# Patient Record
Sex: FEMALE | Race: BLACK OR AFRICAN AMERICAN
[De-identification: names, ages, dates, MRNs, and addresses within clinical notes are randomized per-mention and may not be internally consistent; named-entity substitution may affect disease eponyms.]

---

## 2017-06-02 NOTE — WOMENS IMAGING REPORT
EXAM DESCRIPTION:  BILAT SCREENING MAMMO W/CAD



COMPLETED DATE/TIME:  6/1/2017 2:30 pm



REASON FOR STUDY:  Z12.31 ROUTINE MAMMO Z12.31  ENCNTR SCREEN MAMMOGRAM FOR MALIGNANT NEOPLASM OF CAROLYNN




COMPARISON:  Multiple since 2012



TECHNIQUE:  Standard craniocaudal and mediolateral oblique views of each breast recorded using tadoÂ°a
l acquisition.



LIMITATIONS:  None.



FINDINGS:  No masses, calcifications or architectural distortion. No areas of suspicion.

Read with the assistance of CAD.

.[facility] Cenova Version 1.3

.[facility] - R2 Cenova Version 1.3

.[facility] - R2 Cenova Version 2.4

[place]ova Version 2.[place]

.[facility] - R2  Version 9.2



IMPRESSION:  NORMAL MAMMOGRAM.  BIRADS 1.



BREAST DENSITY:  b. There are scattered areas of fibroglandular density.



BIRAD:  1 NEGATIVE



RECOMMENDATION:  ROUTINE SCREENING



COMMENT:  The patient has been notified of the results by letter per SA requirements. Additional no
tification policies are in place for contacting patient with suspicious or incomplete findings.

Quality ID #225: The American College of Radiology recommends an annual screening mammogram for women
 aged 40 years or over. This facility utilizes a reminder system to ensure that all patients receive 
reminder letters, and/or direct phone calls for appointments. This includes reminders for routine scr
eening mammograms, diagnostic mammograms, or other Breast Imaging Interventions when appropriate.  Th
is patient will be placed in the appropriate reminder system.

The American College of Radiology (ACR) has developed recommendations for screening MRI of the breast
s in certain patient populations, to be used in conjunction with mammography.  Breast MRI surveillanc
e may be appropriate for women with more than 20% lifetime risk of developing breast cancer  as deter
mined by genetic testing, significant family history of the disease, or history of mantle radiation f
or Hodgkins Disease.  ACR Practice Guidelines 2008.



TECHNICAL DOCUMENTATION:  FINDING NUMBER: (1)

ASSESSMENT: (1)

JOB ID:  [id number]

 2011 [employer]- All Rights Reserved

## 2018-03-12 ENCOUNTER — HOSPITAL ENCOUNTER (OUTPATIENT)
Dept: HOSPITAL 62 - OD | Age: 48
End: 2018-03-12
Attending: INTERNAL MEDICINE
Payer: MEDICARE

## 2018-03-12 DIAGNOSIS — Z79.899: ICD-10-CM

## 2018-03-12 DIAGNOSIS — Z94.83: ICD-10-CM

## 2018-03-12 DIAGNOSIS — B25.9: ICD-10-CM

## 2018-03-12 DIAGNOSIS — R19.7: ICD-10-CM

## 2018-03-12 DIAGNOSIS — Z94.0: Primary | ICD-10-CM

## 2018-03-12 LAB
AMYLASE SERPL-CCNC: 46 U/L (ref 30–110)
ANION GAP SERPL CALC-SCNC: 7 MMOL/L (ref 5–19)
APPEARANCE UR: CLEAR
APTT PPP: YELLOW S
BILIRUB UR QL STRIP: NEGATIVE
BUN SERPL-MCNC: 28 MG/DL (ref 7–20)
CALCIUM: 9.6 MG/DL (ref 8.4–10.2)
CHLORIDE SERPL-SCNC: 105 MMOL/L (ref 98–107)
CO2 SERPL-SCNC: 29 MMOL/L (ref 22–30)
CREAT UR-MCNC: 105.9 MG/DL (ref 15–278)
ERYTHROCYTE [DISTWIDTH] IN BLOOD BY AUTOMATED COUNT: 14 % (ref 11.5–14)
GLUCOSE SERPL-MCNC: 111 MG/DL (ref 75–110)
GLUCOSE UR STRIP-MCNC: NEGATIVE MG/DL
HCT VFR BLD CALC: 38.8 % (ref 36–47)
HGB BLD-MCNC: 12.5 G/DL (ref 12–15.5)
KETONES UR STRIP-MCNC: NEGATIVE MG/DL
LIPASE SERPL-CCNC: 58.1 U/L (ref 23–300)
MCH RBC QN AUTO: 27.9 PG (ref 27–33.4)
MCHC RBC AUTO-ENTMCNC: 32.2 G/DL (ref 32–36)
MCV RBC AUTO: 87 FL (ref 80–97)
NITRITE UR QL STRIP: NEGATIVE
PH UR STRIP: 6 [PH] (ref 5–9)
PLATELET # BLD: 225 10^3/UL (ref 150–450)
POTASSIUM SERPL-SCNC: 4.8 MMOL/L (ref 3.6–5)
PROT UR STRIP-MCNC: 6.7 MG/DL (ref ?–12)
PROT UR STRIP-MCNC: NEGATIVE MG/DL
RBC # BLD AUTO: 4.49 10^6/UL (ref 3.72–5.28)
SODIUM SERPL-SCNC: 141.2 MMOL/L (ref 137–145)
SP GR UR STRIP: 1.02
UR PRO/CREAT RATIO RESULT: 0.1 MG/MG (ref 0–0.2)
UROBILINOGEN UR-MCNC: NEGATIVE MG/DL (ref ?–2)
WBC # BLD AUTO: 5.6 10^3/UL (ref 4–10.5)

## 2018-03-12 PROCEDURE — 87496 CYTOMEG DNA AMP PROBE: CPT

## 2018-03-12 PROCEDURE — 85027 COMPLETE CBC AUTOMATED: CPT

## 2018-03-12 PROCEDURE — 36415 COLL VENOUS BLD VENIPUNCTURE: CPT

## 2018-03-12 PROCEDURE — 84156 ASSAY OF PROTEIN URINE: CPT

## 2018-03-12 PROCEDURE — 87799 DETECT AGENT NOS DNA QUANT: CPT

## 2018-03-12 PROCEDURE — 80048 BASIC METABOLIC PNL TOTAL CA: CPT

## 2018-03-12 PROCEDURE — 83690 ASSAY OF LIPASE: CPT

## 2018-03-12 PROCEDURE — 80197 ASSAY OF TACROLIMUS: CPT

## 2018-03-12 PROCEDURE — 81001 URINALYSIS AUTO W/SCOPE: CPT

## 2018-03-12 PROCEDURE — 82150 ASSAY OF AMYLASE: CPT

## 2018-03-12 PROCEDURE — 82570 ASSAY OF URINE CREATININE: CPT

## 2018-03-14 LAB
CMV DNA SERPL NAA+PROBE-LOG IU: (no result) {LOG_IU}/ML
TACROLIMUS SERPL-MCNC: 6.6 NG/ML (ref 2–20)

## 2018-06-04 ENCOUNTER — HOSPITAL ENCOUNTER (OUTPATIENT)
Dept: HOSPITAL 62 - WI | Age: 48
End: 2018-06-04
Attending: OBSTETRICS & GYNECOLOGY
Payer: MEDICARE

## 2018-06-04 DIAGNOSIS — R92.2: ICD-10-CM

## 2018-06-04 DIAGNOSIS — Z12.31: Primary | ICD-10-CM

## 2018-06-04 PROCEDURE — 77067 SCR MAMMO BI INCL CAD: CPT

## 2018-06-04 PROCEDURE — 77063 BREAST TOMOSYNTHESIS BI: CPT

## 2018-06-04 NOTE — WOMENS IMAGING REPORT
EXAM DESCRIPTION:  3D SCREENING MAMMO BILAT



COMPLETED DATE/TIME:  6/4/2018 9:43 am



REASON FOR STUDY:  ROUTINE SCREENING;Z12.31 Z12.31  ENCNTR SCREEN MAMMOGRAM FOR MALIGNANT NEOPLASM OF
 CAROLYNN



COMPARISON:  6/1/2017 and 12/16/2015.



TECHNIQUE:  Standard craniocaudal and mediolateral oblique views of each breast recorded using digita
l acquisition and breast tomosynthesis.



LIMITATIONS:  None.



FINDINGS:  RIGHT BREAST

MASSES: No suspicious masses.

CALCIFICATIONS: No new or suspicious calcifications.

ARCHITECTURAL DISTORTION: None.

DEVELOPING DENSITY: None.

ASYMMETRY: None noted.

OTHER: No other significant findings.

LEFT BREAST

MASSES: No suspicious masses.

CALCIFICATIONS: No new or suspicious calcifications.

ARCHITECTURAL DISTORTION: None.

DEVELOPING DENSITY: Possible developing density in the upper-outer breast, located 2 cm from the nipp
le.

ASYMMETRY: None noted.

OTHER: No other significant findings.

Read with the assistance of CAD.

.[facility] - R2 Cenova Version 1.3

.[facility] - R2 Cenova Version 1.3

.[facility] - R2 Cenova Version 2.4

.[facility] [place] - R2 Cenova Version 2.4

.[facility] - R2  Version 9.2



IMPRESSION:  Possible developing density in the upper-outer left breast.  Stable mammographic appeara
nce of the right breast.



BREAST DENSITY:  b. There are scattered areas of fibroglandular density.



BIRAD:  0 Incomplete:  Needs Additional Imaging Evaluation and/or prior Mammograms for Comparison.



RECOMMENDATION:  RECOMMENDED FOLLOW-UP: Recommend additional evaluation with compression breast tomos
ynthesis views of the left breast and ultrasound of the left breast.  Recommend routine screening khoi
mography of the right breast.

The patient will be contacted for additional imaging.



COMMENT:  The patient has been notified of the results by letter per SA requirements. Additional no
tification policies are in place for contacting patient with suspicious or incomplete findings.

Quality ID #225: The American College of Radiology recommends an annual screening mammogram for women
 aged 40 years or over. This facility utilizes a reminder system to ensure that all patients receive 
reminder letters, and/or direct phone calls for appointments. This includes reminders for routine scr
eening mammograms, diagnostic mammograms, or other Breast Imaging Interventions when appropriate.  Th
is patient will be placed in the appropriate reminder system.

The American College of Radiology (ACR) has developed recommendations for screening MRI of the breast
s in certain patient populations, to be used in conjunction with mammography.  Breast MRI surveillanc
e may be appropriate for women with more than 20% lifetime risk of developing breast cancer  as deter
mined by genetic testing, significant family history of the disease, or history of mantle radiation f
or Hodgkins Disease.  ACR Practice Guidelines 2008.

[employer]



PQRS 6045F: Fluoroscopic imaging is not utilized for breast tomosynthesis.



TECHNICAL DOCUMENTATION:  FINDING NUMBER: (1)

ASSESSMENT: (1)

JOB ID:  [id number]

 2011 [employer]- All Rights Reserved



Reading location - IP/workstation name: [email or web address]

## 2018-06-12 ENCOUNTER — HOSPITAL ENCOUNTER (OUTPATIENT)
Dept: HOSPITAL 62 - WI | Age: 48
End: 2018-06-12
Attending: OBSTETRICS & GYNECOLOGY
Payer: MEDICARE

## 2018-06-12 DIAGNOSIS — N63.21: Primary | ICD-10-CM

## 2018-06-13 NOTE — WOMENS IMAGING REPORT
EXAM DESCRIPTION:  LEFT DIAGNOSTIC MAMMO W/CAD



COMPLETED DATE/TIME:  6/12/2018 10:01 am



REASON FOR STUDY:  LEFT BREAST DENSITY N63.21  UNSPECIFIED LUMP IN THE LEFT BREAST, UPPER OUTER QUAD



COMPARISON:  Multiple since 2012



TECHNIQUE:  Cone compression craniocaudal and mediolateral oblique images of the breast recorded with
 digital acquisition.



LIMITATIONS:  None.



FINDINGS:  BREAST: Left

MASSES: No suspicious masses.

CALCIFICATIONS: No new or suspicious calcifications.

ARCHITECTURAL DISTORTION: None.

DEVELOPING DENSITY: None.

ASYMMETRY: None noted.

OTHER: No other significant findings.

Read with the assistance of CAD.

.[facility] - R2 Cenova Version 1.3

.[facility] - R2 Cenova Version 1.3

.[facility] - R2 Cenova Version 2.4

.[facility] - R2 Cenova Version 2.4

.[facility] - R2  Version 9.2



IMPRESSION:  No mammographic evidence for malignancy left breast



BREAST DENSITY:  b. There are scattered areas of fibroglandular density.



BIRAD:  1 Negative.



RECOMMENDATION:  RECOMMENDED FOLLOW UP: Please continue yearly bilateral screening mammography/ tomos
ynthesis in June 2019

SPECIFIC INTERVENTION/IMAGING/CONSULTATION RECOMMENDED:No additional intervention/ imaging/consultati
on needed at this time.

COMMUNICATION:Patient notified by letter



COMMENT:  The patient has been notified of the results by letter per MQSA requirements. Additional no
tification policies are in place for contacting patient with suspicious or incomplete findings.

Quality ID #225: The American College of Radiology recommends an annual screening mammogram for women
 aged 40 years or over. This facility utilizes a reminder system to ensure that all patients receive 
reminder letters, and/or direct phone calls for appointments. This includes reminders for routine scr
eening mammograms, diagnostic mammograms, or other Breast Imaging Interventions when appropriate.  Th
is patient will be placed in the appropriate reminder system.

The American College of Radiology (ACR) has developed recommendations for screening MRI of the breast
s in certain patient populations, to be used in conjunction with mammography.  Breast MRI surveillanc
e may be appropriate for women with more than 20% lifetime risk of developing breast cancer  as deter
mined by genetic testing, significant family history of the disease, or history of mantle radiation f
or Hodgkins Disease.  ACR Practice Guidelines 2008.



TECHNICAL DOCUMENTATION:  FINDING NUMBER: (1)

ASSESSMENT: (1)

JOB ID:  [id number]

 2011 [employer]- All Rights Reserved



Reading location - IP/workstation name: [email or web address]

## 2019-02-05 ENCOUNTER — HOSPITAL ENCOUNTER (OUTPATIENT)
Dept: HOSPITAL 62 - OD | Age: 49
End: 2019-02-05
Attending: INTERNAL MEDICINE
Payer: MEDICARE

## 2019-02-05 DIAGNOSIS — B25.9: Primary | ICD-10-CM

## 2019-02-05 DIAGNOSIS — Z79.899: ICD-10-CM

## 2019-02-05 DIAGNOSIS — Z94.0: ICD-10-CM

## 2019-02-05 DIAGNOSIS — Z94.83: ICD-10-CM

## 2019-02-05 LAB
AMYLASE SERPL-CCNC: 55 U/L (ref 30–110)
ANION GAP SERPL CALC-SCNC: 8 MMOL/L (ref 5–19)
APPEARANCE UR: CLEAR
APTT PPP: YELLOW S
BILIRUB UR QL STRIP: NEGATIVE
BUN SERPL-MCNC: 31 MG/DL (ref 7–20)
CALCIUM: 9.4 MG/DL (ref 8.4–10.2)
CHLORIDE SERPL-SCNC: 106 MMOL/L (ref 98–107)
CO2 SERPL-SCNC: 27 MMOL/L (ref 22–30)
CREAT UR-MCNC: 112.9 MG/DL (ref 15–278)
ERYTHROCYTE [DISTWIDTH] IN BLOOD BY AUTOMATED COUNT: 13.8 % (ref 11.5–14)
GLUCOSE SERPL-MCNC: 121 MG/DL (ref 75–110)
GLUCOSE UR STRIP-MCNC: NEGATIVE MG/DL
HCT VFR BLD CALC: 37.8 % (ref 36–47)
HGB BLD-MCNC: 12.4 G/DL (ref 12–15.5)
KETONES UR STRIP-MCNC: NEGATIVE MG/DL
LIPASE SERPL-CCNC: 60.2 U/L (ref 23–300)
MCH RBC QN AUTO: 28.5 PG (ref 27–33.4)
MCHC RBC AUTO-ENTMCNC: 32.7 G/DL (ref 32–36)
MCV RBC AUTO: 87 FL (ref 80–97)
NITRITE UR QL STRIP: NEGATIVE
PH UR STRIP: 6 [PH] (ref 5–9)
PLATELET # BLD: 158 10^3/UL (ref 150–450)
POTASSIUM SERPL-SCNC: 5 MMOL/L (ref 3.6–5)
PROT UR STRIP-MCNC: 5.8 MG/DL (ref ?–12)
PROT UR STRIP-MCNC: NEGATIVE MG/DL
RBC # BLD AUTO: 4.34 10^6/UL (ref 3.72–5.28)
SODIUM SERPL-SCNC: 141.4 MMOL/L (ref 137–145)
SP GR UR STRIP: 1.02
UR PRO/CREAT RATIO RESULT: 0.1 MG/MG (ref 0–0.2)
UROBILINOGEN UR-MCNC: 2 MG/DL (ref ?–2)
WBC # BLD AUTO: 3.5 10^3/UL (ref 4–10.5)

## 2019-02-05 PROCEDURE — 83690 ASSAY OF LIPASE: CPT

## 2019-02-05 PROCEDURE — 80048 BASIC METABOLIC PNL TOTAL CA: CPT

## 2019-02-05 PROCEDURE — 87799 DETECT AGENT NOS DNA QUANT: CPT

## 2019-02-05 PROCEDURE — 80197 ASSAY OF TACROLIMUS: CPT

## 2019-02-05 PROCEDURE — 87496 CYTOMEG DNA AMP PROBE: CPT

## 2019-02-05 PROCEDURE — 82570 ASSAY OF URINE CREATININE: CPT

## 2019-02-05 PROCEDURE — 85027 COMPLETE CBC AUTOMATED: CPT

## 2019-02-05 PROCEDURE — 84156 ASSAY OF PROTEIN URINE: CPT

## 2019-02-05 PROCEDURE — 81001 URINALYSIS AUTO W/SCOPE: CPT

## 2019-02-05 PROCEDURE — 36415 COLL VENOUS BLD VENIPUNCTURE: CPT

## 2019-02-05 PROCEDURE — 82150 ASSAY OF AMYLASE: CPT

## 2019-02-07 LAB — TACROLIMUS SERPL-MCNC: 11.6 NG/ML (ref 2–20)

## 2019-02-08 LAB — CMV DNA SERPL NAA+PROBE-LOG IU: (no result) {LOG_IU}/ML

## 2019-04-12 ENCOUNTER — HOSPITAL ENCOUNTER (EMERGENCY)
Dept: HOSPITAL 62 - ER | Age: 49
Discharge: HOME | End: 2019-04-12
Payer: MEDICARE

## 2019-04-12 VITALS — SYSTOLIC BLOOD PRESSURE: 159 MMHG | DIASTOLIC BLOOD PRESSURE: 84 MMHG

## 2019-04-12 DIAGNOSIS — Z90.710: ICD-10-CM

## 2019-04-12 DIAGNOSIS — Z94.83: ICD-10-CM

## 2019-04-12 DIAGNOSIS — Z94.0: ICD-10-CM

## 2019-04-12 DIAGNOSIS — Z87.442: ICD-10-CM
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
## 2019-04-12 DIAGNOSIS — S92.355A: Primary | ICD-10-CM

## 2019-04-12 DIAGNOSIS — I10: ICD-10-CM

## 2019-04-12 DIAGNOSIS — E10.9: ICD-10-CM

## 2019-04-12 PROCEDURE — 99283 EMERGENCY DEPT VISIT LOW MDM: CPT

## 2019-04-12 NOTE — RADIOLOGY REPORT (SQ)
EXAM DESCRIPTION:  FOOT LEFT COMPLETE



COMPLETED DATE/TIME:  4/12/2019 7:42 pm



REASON FOR STUDY:  left lateral foot pain, near 5th metatarsal.



COMPARISON:  None.



NUMBER OF VIEWS:  Three views.



TECHNIQUE:  AP, lateral and oblique  radiographic images acquired of the left foot.



LIMITATIONS:  None.



FINDINGS:  MINERALIZATION: Normal.

BONES: Transverse fracture of the base of the 5th metatarsal without displacement.

JOINTS: No effusions.

SOFT TISSUES: No soft tissue swelling.  No foreign body.

OTHER: No other significant finding.



IMPRESSION:  Transverse fracture base of the 5th metatarsal.



TECHNICAL DOCUMENTATION:  JOB ID:  [id number]

 2011 [employer]- All Rights Reserved



Reading location - IP/workstation name: [email or web address]

## 2019-04-12 NOTE — ER DOCUMENT REPORT
HPI





- HPI


Time Seen by Provider: 19 19:22


Pain Level: 3


Notes: 





Patient is a 48-year-old female with a history of kidney and pancreas transplant

3 years ago who presents to the emergency department complaining of left lateral

foot pain status post possible injury today.  Patient states that she was 

walking when she felt a 'pop' in the lateral part of her foot.  Patient states 

that she has been able to ablate, but is limping.  She has noticed some mild 

swelling to the lateral foot since then.  Patient states that she otherwise 

feels well.  She has been eating and drinking without difficulty.  She is 

urinating normally and having normal bowel movements.  She has not noticed any 

other swelling to her legs bilaterally that is new for her.  Her specialist is 

in [facility].  Denies any headache, fever, URI, sore throat, chest pain, 

palpitations, syncope, cough, shortness of breath, wheeze, dyspnea, abdominal 

pain, nausea/vomiting/diarrhea, urinary retention, dysuria, hematuria, loss of 

control of bowel or bladder, numbness/tingling, muscle paralysis/weakness, or 

rash.





- ROS


Systems Reviewed and Negative: Yes All other systems reviewed and negative





- REPRODUCTIVE


Reproductive: DENIES: Pregnant:





- DERM


Skin Color: Normal





Past Medical History





- Social History


Smoking Status: Unknown if Ever Smoked


Family History: Reviewed & Not Pertinent


Patient has suicidal ideation: No


Patient has homicidal ideation: No





- Past Medical History


Cardiac Medical History: Reports: Hx Hypertension - on meds


   Denies: Hx Coronary Artery Disease, Hx Heart Attack


Pulmonary Medical History: Reports: Hx Tuberculosis - EXPOSURE AT WORK AND 

TREATED


   Denies: Hx Asthma, Hx Bronchitis, Hx COPD, Hx Pneumonia


Neurological Medical History: Denies: Hx Cerebrovascular Accident, Hx Seizures


Endocrine Medical History: Reports: Hx Diabetes Mellitus Type 1


Renal/ Medical History: Reports: Hx Kidney Stones.  Denies: Hx Peritoneal 

Dialysis


GI Medical History: 


Musculoskeletal Medical History: Reports Hx Arthritis


Infectious Medical History: 


Past Surgical History: Reports: Hx Hysterectomy, Hx Tubal Ligation.  Denies: Hx 

Appendectomy, Hx Bowel Surgery, Hx  Section, Hx Cholecystectomy, Hx 

Mastectomy, Hx Open Heart Surgery, Hx Tonsillectomy





- Immunizations


Hx Diphtheria, Pertussis, Tetanus Vaccination: Yes





Vertical Provider Document





- CONSTITUTIONAL


Agree With Documented VS: Yes


Notes: 





PHYSICAL EXAMINATION:





GENERAL: Well-appearing, well-nourished and in no acute distress.





LUNGS: Breath sounds clear to auscultation bilaterally and equal.  No wheezes 

rales or rhonchi.





HEART: Regular rate and rhythm without murmurs, rubs, gallops.





Musculoskeletal: Lt foot/ankle:  FROM to passive/active. Strength 5+/5. N/V 

intact distal.  + tenderness to the lateral approx 5th metatarsal and lateral 

plantar foot.  No bony tenderness of the ankle of foot otherwise.  Achilles 

intact.  





Extremities:  trace pitting edema b/l LE's.  Murphy neg b/l and other asymmetry 

noted.  PT/DP pulses 2+ b/l.  Foot is warm.  Capillary refill less than 3 

seconds.





NEUROLOGICAL: Normal speech, limping gait.  Normal sensory, motor exams 





PSYCH: Normal mood, normal affect.





SKIN: see above.





- INFECTION CONTROL


TRAVEL OUTSIDE OF THE [place] IN LAST 30 DAYS: No





Course





- Re-evaluation


Re-evalutation: 





19 


Patient is an afebrile, well-hydrated, 48-year-old female who presents to the ED

with a fracture to the left 5th metatarsal base.  Vitals are acceptable without 

any significant tachycardia, tachypnea, or hypoxia.  PE is otherwise 

unremarkable for any neurovascular compromise, obvious tendon/ligament rupture, 

open fracture, septic joint.  See XR result.  Splint applied today and crutches 

provided.  Patient is nontoxic-appearing.  No other labs or imaging warranted at

this time based on H&P.  Conservative measures otherwise for symptoms.  Recheck 

with your PCM in 3-5 days.  Call orthopedics Monday to schedule an appointment 

for further evaluation and management.  Return to the ED with any worsen

ing/concerning symptoms otherwise as reviewed in discharge.  Patient is in 

agreement.





- Vital Signs


Vital signs: 


                                        











Temp Pulse Resp BP Pulse Ox


 


 98.2 F   92   17   159/84 H  98 


 


 19 19:10  19 19:10  19 19:10  19 19:10  19 19:10














Procedures





- Immobilization


  ** Left Foot


Pre-Proc Neuro Vasc Exam: Normal


Immobilizer type: Posterior ankle


Performed by: PCT


Post-Proc Neuro Vasc Exam: Normal, Unchanged from pre-exam





Discharge





- Discharge


Clinical Impression: 


Nondisplaced fracture of fifth left metatarsal bone


Qualifiers:


 Encounter type: initial encounter Fracture type: closed Qualified Code(s): 

S92.355A - Nondisplaced fracture of fifth metatarsal bone, left foot, initial 

encounter for closed fracture





Condition: Stable


Disposition: HOME, SELF-CARE


Additional Instructions: 


Rest, Ice, Compression, Elevation


Use crutches/splint as directed


Tylenol/ibuprofen as needed


F/u with your PCP in 3-5 days for a recheck


Call orthopedics Monday to schedule an appointment for further evaluation and 

management





Return to the ED with any worsening symptoms and/or development of fever, 

headache, chest pain, palpitations, syncope, shortness of breath, trouble 

breathing, abdominal pain, n/v/d, muscle weakness/paralysis, numbness/tingling, 

swelling, redness, or other worsening symptoms that are concerning to you.


Prescriptions: 


Morphine Sulfate [Morphine Ir 15 Mg Tablet] 15 mg PO TID #6 tablet


Forms:  Elevated Blood Pressure


Referrals: 


JAVON VEGA MD [Primary Care Provider] - Follow up as needed


[facility] [Provider Group] - Follow up in 3-5 days

## 2019-07-05 ENCOUNTER — HOSPITAL ENCOUNTER (INPATIENT)
Dept: HOSPITAL 62 - ER | Age: 49
LOS: 6 days | Discharge: HOME | DRG: 690 | End: 2019-07-11
Attending: INTERNAL MEDICINE | Admitting: INTERNAL MEDICINE
Payer: MEDICARE

## 2019-07-05 DIAGNOSIS — Z86.11: ICD-10-CM

## 2019-07-05 DIAGNOSIS — N39.0: ICD-10-CM

## 2019-07-05 DIAGNOSIS — E10.9: ICD-10-CM

## 2019-07-05 DIAGNOSIS — Z94.83: ICD-10-CM

## 2019-07-05 DIAGNOSIS — Z79.52: ICD-10-CM

## 2019-07-05 DIAGNOSIS — Z79.82: ICD-10-CM

## 2019-07-05 DIAGNOSIS — Z88.7: ICD-10-CM

## 2019-07-05 DIAGNOSIS — Z88.6: ICD-10-CM

## 2019-07-05 DIAGNOSIS — N10: Primary | ICD-10-CM

## 2019-07-05 DIAGNOSIS — Z79.899: ICD-10-CM

## 2019-07-05 DIAGNOSIS — E66.9: ICD-10-CM

## 2019-07-05 DIAGNOSIS — I10: ICD-10-CM

## 2019-07-05 DIAGNOSIS — M19.90: ICD-10-CM

## 2019-07-05 DIAGNOSIS — Z88.2: ICD-10-CM

## 2019-07-05 DIAGNOSIS — Z82.49: ICD-10-CM

## 2019-07-05 DIAGNOSIS — R78.81: ICD-10-CM

## 2019-07-05 DIAGNOSIS — Z79.4: ICD-10-CM

## 2019-07-05 DIAGNOSIS — Z94.0: ICD-10-CM

## 2019-07-05 DIAGNOSIS — B96.20: ICD-10-CM

## 2019-07-05 PROCEDURE — 71045 X-RAY EXAM CHEST 1 VIEW: CPT

## 2019-07-05 PROCEDURE — 74176 CT ABD & PELVIS W/O CONTRAST: CPT

## 2019-07-05 PROCEDURE — 81025 URINE PREGNANCY TEST: CPT

## 2019-07-05 PROCEDURE — 80053 COMPREHEN METABOLIC PANEL: CPT

## 2019-07-05 PROCEDURE — 85025 COMPLETE CBC W/AUTO DIFF WBC: CPT

## 2019-07-05 PROCEDURE — 87045 FECES CULTURE AEROBIC BACT: CPT

## 2019-07-05 PROCEDURE — 87040 BLOOD CULTURE FOR BACTERIA: CPT

## 2019-07-05 PROCEDURE — 36415 COLL VENOUS BLD VENIPUNCTURE: CPT

## 2019-07-05 PROCEDURE — 87186 SC STD MICRODIL/AGAR DIL: CPT

## 2019-07-05 PROCEDURE — 99285 EMERGENCY DEPT VISIT HI MDM: CPT

## 2019-07-05 PROCEDURE — 81001 URINALYSIS AUTO W/SCOPE: CPT

## 2019-07-05 PROCEDURE — 76700 US EXAM ABDOM COMPLETE: CPT

## 2019-07-05 PROCEDURE — 87077 CULTURE AEROBIC IDENTIFY: CPT

## 2019-07-05 PROCEDURE — 83605 ASSAY OF LACTIC ACID: CPT

## 2019-07-05 PROCEDURE — 87205 SMEAR GRAM STAIN: CPT

## 2019-07-05 PROCEDURE — 82803 BLOOD GASES ANY COMBINATION: CPT

## 2019-07-05 PROCEDURE — 87088 URINE BACTERIA CULTURE: CPT

## 2019-07-05 PROCEDURE — 80048 BASIC METABOLIC PNL TOTAL CA: CPT

## 2019-07-05 PROCEDURE — 83036 HEMOGLOBIN GLYCOSYLATED A1C: CPT

## 2019-07-05 PROCEDURE — 83735 ASSAY OF MAGNESIUM: CPT

## 2019-07-05 PROCEDURE — 87086 URINE CULTURE/COLONY COUNT: CPT

## 2019-07-05 NOTE — ER DOCUMENT REPORT
ED General





- General


Chief Complaint: Nausea


Stated Complaint: Flank pain


Time Seen by Provider: 19 23:47


Notes: 


Patient is a 49-year-old female that comes emergency department for chief 

complaint of fever, generalized body aches, and bilateral flank/side pain.  

Symptoms started 2-1/2 days ago.  She has a history of kidney failure requiring 

kidney transplant and pancreatic transplant (she was previously a bad diabetic 

requiring dialysis, these transplants resolved both of these issues).  She 

follows with [facility] transplant team, had a transplant performed in 2016, is 

currently on immunosuppressive medications.  Patient states she vomited once, 

initially on Wednesday she had multiple episodes of diarrhea but now she only 

had one episode of diarrhea over the past day.  She denies difficulty breathing,

chest pain, dysuria, abdominal pain.





TRAVEL OUTSIDE OF THE [place] IN LAST 30 DAYS: No





- Related Data


Allergies/Adverse Reactions: 


                                        





propoxyphene napsylate [From Darvocet-N 100] Allergy (Severe, Verified 16 

09:00)


   n and v,dizziness,coughing


tuberculin, purified protein deriva [Tuberculin,Purif.Prot.Deriv.] Allergy 

(Severe, Verified 16 09:00)


   PPD CONVERTER


sulfamethoxazole [From Bactrim] Allergy (Verified 19 18:59)


   


trimethoprim [From Bactrim] Allergy (Verified 19 18:59)


   


hydrocodone bitartrate [From Vicodin] Adverse Reaction (Unknown, Verified 

16 09:00)


   Nausea


oxycodone HCl [From Percocet] Adverse Reaction (Unknown, Verified 16 

09:00)


   Nausea











Past Medical History





- General


Information source: Patient





- Social History


Smoking Status: Never Smoker


Frequency of alcohol use: None


Drug Abuse: None


Lives with: Family


Family History: Reviewed & Not Pertinent





- Past Medical History


Cardiac Medical History: Reports: Hx Hypertension - on meds


   Denies: Hx Coronary Artery Disease, Hx Heart Attack


Pulmonary Medical History: Reports: Hx Tuberculosis - EXPOSURE AT WORK AND 

TREATED


   Denies: Hx Asthma, Hx Bronchitis, Hx COPD, Hx Pneumonia


Neurological Medical History: Denies: Hx Cerebrovascular Accident, Hx Seizures


Endocrine Medical History: Reports: Hx Diabetes Mellitus Type 1


Renal/ Medical History: Reports: Hx Kidney Stones.  Denies: Hx Peritoneal 

Dialysis


GI Medical History: 


Musculoskeletal Medical History: Reports Hx Arthritis


Infectious Medical History: 


Past Surgical History: Reports: Hx Hysterectomy, Hx Tubal Ligation.  Denies: Hx 

Appendectomy, Hx Bowel Surgery, Hx  Section, Hx Cholecystectomy, Hx Ma

stectomy, Hx Open Heart Surgery, Hx Tonsillectomy





- Immunizations


Hx Diphtheria, Pertussis, Tetanus Vaccination: Yes





Review of Systems





- Review of Systems


Constitutional: See HPI


EENT: No symptoms reported


Cardiovascular: No symptoms reported


Respiratory: No symptoms reported


Gastrointestinal: See HPI


Genitourinary: See HPI


Female Genitourinary: No symptoms reported


Musculoskeletal: No symptoms reported


Skin: No symptoms reported


Hematologic/Lymphatic: No symptoms reported


Neurological/Psychological: No symptoms reported





Physical Exam





- Vital signs


Vitals: 


                                        











Temp Pulse Resp BP Pulse Ox


 


 100.9 F H  112 H  16   160/75 H  96 


 


 19 21:03  19 21:03  19 21:03  19 21:03  19 21:03














- Notes


Notes: 





GENERAL: Alert, interacts well.  Mildly ill-appearing but nontoxic in 

appearance.


HEAD: Normocephalic, atraumatic.


EYES: Pupils equal, round, and reactive to light. Extraocular movements intact.


ENT: Oral mucosa moist, tongue midline. Oropharynx unremarkable. Airway patent. 


NECK: Full range of motion. Supple. Trachea midline.


LUNGS: Clear to auscultation bilaterally, no wheezes, rales, or rhonchi. No 

respiratory distress.


HEART: Mildly tachycardic, normal rhythm.  No murmur


ABDOMEN: Soft, non-tender. Non-distended. Bowel sounds present in all 4 

quadrants.


GENITOURINARY: Deferred


EXTREMITIES: Moves all 4 extremities spontaneously. No edema, normal radial and 

dorsalis pedis pulses bilaterally. No cyanosis.


BACK: no cervical, thoracic, lumbar midline tenderness. No saddle anesthesia, 

normal distal neurovascular exam. Moves all extremities in full range of motion.


NEUROLOGICAL: Alert and oriented x3. Normal speech. Cranial nerves II through 

XII grossly intact. 


PSYCH: Normal affect, normal mood.


SKIN: Flushed and warm but no rash or lesion is noted.





Course





- Re-evaluation


Re-evalutation: 


Patient is slightly ill-appearing, febrile, initially tachycardic but this 

resolved.  Blood pressures have been unremarkable.  Abdomen is soft and benign. 

No CVA tenderness.  Because of patient's resolved diarrheal illness and now 

developing symptoms I suspect she has an underlying kidney infection.  This is 

in the setting of a transplant patient on immunosuppressive therapy.  Blood 

cultures pending.  Lactic acid not elevated.  CBC unremarkable, chemistry 

unremarkable with unremarkable creatinine.





Patient was not able to give a urine sample, catheterization was performed, this

shows obvious infection with positive nitrites, large white blood cells in 

clumps.  Culture placed.  Given Rocephin.  Discussed with patient.





19 04:15


I spoke with Dr. Ng, on-call for [facility] transplant team with the team that he

is following the patient, he recommends that patient can be admitted to the 

hospitalist here with empiric antibiotics, follow urine cultures, and continued 

immunosuppressive therapy.  He states there does not appear to be an indication 

for transfer at this time with patient's normal renal functioning and s

traightforward illness.








I discussed this with patient, she is very agreeable to the possibility of 

staying here.  We do have nephrology coming on call as well.  I spoke with Dr. Robles, hospitalist, patient was admitted to the floor.





- Vital Signs


Vital signs: 


                                        











Temp Pulse Resp BP Pulse Ox


 


 99.7 F   112 H  19   126/76 H  100 


 


 19 03:24  19 21:03  19 07:23  19 07:23  19 07:23














- Laboratory


Result Diagrams: 


                                 19 01:13





                                 19 01:13


Laboratory results interpreted by me: 


                                        











  19





  01:13 01:13 03:24


 


RDW  14.4 H  


 


Seg Neutrophils %  87.6 H  


 


Lymphocytes %  9.0 L  


 


BUN   21 H 


 


Est GFR (Non-Af Amer)   52 L 


 


Glucose   125 H 


 


Direct Bilirubin   0.6 H 


 


AST   38 H 


 


Total Protein   8.9 H 


 


Urine Protein    30 H


 


Urine Ketones    25 H


 


Urine Blood    MODERATE H


 


Urine Nitrite    POSITIVE H


 


Ur Leukocyte Esterase    LARGE H














Discharge





- Discharge


Clinical Impression: 


 Pyelonephritis, Kidney transplant recipient





Fever


Qualifiers:


 Fever type: unspecified Qualified Code(s): R50.9 - Fever, unspecified





Condition: Stable


Disposition: ADMITTED AS INPATIENT


Admitting Provider: Travis (Hospitalist)


Unit Admitted: Medical Floor

## 2019-07-06 LAB
ADD MANUAL DIFF: NO
ALBUMIN SERPL-MCNC: 4.8 G/DL (ref 3.5–5)
ALP SERPL-CCNC: 73 U/L (ref 38–126)
ALT SERPL-CCNC: 22 U/L (ref 9–52)
ANION GAP SERPL CALC-SCNC: 11 MMOL/L (ref 5–19)
APPEARANCE UR: (no result)
APTT PPP: YELLOW S
AST SERPL-CCNC: 38 U/L (ref 14–36)
BASE EXCESS BLDV CALC-SCNC: 3.1 MMOL/L
BASOPHILS # BLD AUTO: 0 10^3/UL (ref 0–0.2)
BASOPHILS NFR BLD AUTO: 0.2 % (ref 0–2)
BILIRUB DIRECT SERPL-MCNC: 0.6 MG/DL (ref 0–0.4)
BILIRUB SERPL-MCNC: 1.3 MG/DL (ref 0.2–1.3)
BILIRUB UR QL STRIP: NEGATIVE
BUN SERPL-MCNC: 21 MG/DL (ref 7–20)
CALCIUM: 9.8 MG/DL (ref 8.4–10.2)
CHLORIDE SERPL-SCNC: 101 MMOL/L (ref 98–107)
CO2 SERPL-SCNC: 29 MMOL/L (ref 22–30)
EOSINOPHIL # BLD AUTO: 0 10^3/UL (ref 0–0.6)
EOSINOPHIL NFR BLD AUTO: 0.2 % (ref 0–6)
ERYTHROCYTE [DISTWIDTH] IN BLOOD BY AUTOMATED COUNT: 14.4 % (ref 11.5–14)
GLUCOSE SERPL-MCNC: 125 MG/DL (ref 75–110)
GLUCOSE UR STRIP-MCNC: NEGATIVE MG/DL
HCO3 BLDV-SCNC: 30.3 MMOL/L (ref 20–32)
HCT VFR BLD CALC: 44.9 % (ref 36–47)
HGB BLD-MCNC: 14.4 G/DL (ref 12–15.5)
KETONES UR STRIP-MCNC: 25 MG/DL
LYMPHOCYTES # BLD AUTO: 0.7 10^3/UL (ref 0.5–4.7)
LYMPHOCYTES NFR BLD AUTO: 9 % (ref 13–45)
MCH RBC QN AUTO: 27.6 PG (ref 27–33.4)
MCHC RBC AUTO-ENTMCNC: 32 G/DL (ref 32–36)
MCV RBC AUTO: 86 FL (ref 80–97)
MONOCYTES # BLD AUTO: 0.2 10^3/UL (ref 0.1–1.4)
MONOCYTES NFR BLD AUTO: 3 % (ref 3–13)
NEUTROPHILS # BLD AUTO: 7.1 10^3/UL (ref 1.7–8.2)
NEUTS SEG NFR BLD AUTO: 87.6 % (ref 42–78)
NITRITE UR QL STRIP: POSITIVE
PCO2 BLDV: 57.1 MMHG (ref 35–63)
PH BLDV: 7.34 [PH] (ref 7.3–7.42)
PH UR STRIP: 6 [PH] (ref 5–9)
PLATELET # BLD: 159 10^3/UL (ref 150–450)
POTASSIUM SERPL-SCNC: 4.7 MMOL/L (ref 3.6–5)
PROT SERPL-MCNC: 8.9 G/DL (ref 6.3–8.2)
PROT UR STRIP-MCNC: 30 MG/DL
RBC # BLD AUTO: 5.22 10^6/UL (ref 3.72–5.28)
SODIUM SERPL-SCNC: 141.4 MMOL/L (ref 137–145)
SP GR UR STRIP: 1.02
TOTAL CELLS COUNTED % (AUTO): 100 %
UROBILINOGEN UR-MCNC: NEGATIVE MG/DL (ref ?–2)
WBC # BLD AUTO: 8.1 10^3/UL (ref 4–10.5)

## 2019-07-06 RX ADMIN — PIPERACILLIN AND TAZOBACTAM SCH MLS/HR: 3; .375 INJECTION, POWDER, LYOPHILIZED, FOR SOLUTION INTRAVENOUS; PARENTERAL at 18:14

## 2019-07-06 RX ADMIN — HEPARIN SODIUM SCH UNIT: 5000 INJECTION, SOLUTION INTRAVENOUS; SUBCUTANEOUS at 21:34

## 2019-07-06 RX ADMIN — PIPERACILLIN AND TAZOBACTAM SCH MLS/HR: 3; .375 INJECTION, POWDER, LYOPHILIZED, FOR SOLUTION INTRAVENOUS; PARENTERAL at 23:37

## 2019-07-06 RX ADMIN — TACROLIMUS SCH: 1 CAPSULE ORAL at 21:35

## 2019-07-06 RX ADMIN — HEPARIN SODIUM SCH UNIT: 5000 INJECTION, SOLUTION INTRAVENOUS; SUBCUTANEOUS at 15:11

## 2019-07-06 RX ADMIN — DOCUSATE SODIUM SCH: 100 CAPSULE, LIQUID FILLED ORAL at 18:13

## 2019-07-06 RX ADMIN — HEPARIN SODIUM SCH UNIT: 5000 INJECTION, SOLUTION INTRAVENOUS; SUBCUTANEOUS at 07:19

## 2019-07-06 RX ADMIN — ATORVASTATIN CALCIUM SCH MG: 10 TABLET, FILM COATED ORAL at 21:35

## 2019-07-06 RX ADMIN — ACETAMINOPHEN PRN MG: 325 TABLET ORAL at 14:18

## 2019-07-06 RX ADMIN — SODIUM CHLORIDE PRN MLS/HR: 9 INJECTION, SOLUTION INTRAVENOUS at 21:34

## 2019-07-06 RX ADMIN — ACETAMINOPHEN PRN MG: 325 TABLET ORAL at 20:28

## 2019-07-06 NOTE — RADIOLOGY REPORT (SQ)
EXAM DESCRIPTION: 



XR CHEST 1 VIEW



COMPLETED DATE/TME:  07/05/2019 23:53



CLINICAL HISTORY: 



49 years, Female, fever



COMPARISON:

X-ray chest 5/27/2016



NUMBER OF VIEWS:





TECHNIQUE:





LIMITATIONS:

None.



FINDINGS:



No evidence of pulmonary infiltrate or pleural effusion.



The heart and mediastinum are unremarkable.



Pulmonary vascularity appears normal.



IMPRESSION:



No acute finding.

 



copyright 2011 [employer]- All Rights Reserved

## 2019-07-06 NOTE — PDOC H&P
History of Present Illness


Admission Date/PCP: 


  19 04:54





  





Patient complains of: Nausea


History of Present Illness: 


OMAR HARTMAN is a 49 year old female with past medical history of renal 

and pancreatic transplant 2016 on immunosuppression.  She presents 2 and half 

days after the onset of left-sided flank pain nausea without vomiting and some 

diarrhea without blood.  In the emergency room she is found to have fever, 

tachycardia, pyuria.  Her transplant team at [facility] is consulted recommending 

hospitalization with empiric antibiotics.  She started on Rocephin and referred 

to the hospitalist for admission.








Past Medical History


Cardiac Medical History: Reports: Hypertension - on meds


   Denies: Coronary Artery Disease, Myocardial Infarction


Pulmonary Medical History: Reports: Tuberculosis - EXPOSURE AT WORK AND TREATED


   Denies: Asthma, Bronchitis, Chronic Obstructive Pulmonary Disease (COPD), 

Pneumonia


Neurological Medical History: 


   Denies: Seizures


Endocrine Medical History: Reports: Diabetes Mellitus Type 1


Renal/ Medical History: 


GI Medical History: 


Musculoskeltal Medical History: Reports: Arthritis


Hematology: Reports: Anemia - hx of


   Denies: Sickle Cell Disease





Past Surgical History


Past Surgical History: Reports: Hysterectomy, Tubal Ligation, Other - Renal and 

pancreatic transplant


   Denies: Amputation, Appendectomy,  Section, Cholecystectomy, 

Mastectomy, Tonsillectomy





Social History


Information Source: Patient, [facility] Records


Smoking Status: Unknown if Ever Smoked


Frequency of Alcohol Use: None


Hx Recreational Drug Use: No


Hx Prescription Drug Abuse: No





- Advance Directive


Resuscitation Status: Full Code





Family History


Family History: Hypertension


Parental Family History Reviewed: Yes


Children Family History Reviewed: Yes


Sibling(s) Family History Reviewed.: Yes





Medication/Allergy


Home Medications: 








Timolol Maleate [Timoptic 0.25% Oph Soln 5 ml] 1 drop OU BID 12 


Amlodipine Besylate 10 mg PO DAILY 14 


Latanoprost [Xalatan 0.005% Oph Soln 2.5 ml] 1 drop OP QHS 14 


Aspirin [Ecotrin 81 mg EC Tablet] 81 mg PO DAILY #0 tabec 14 


Bumetanide [Bumex 2 mg Tablet] 3 mg PO BID 07/07/15 


Clonidine HCl [Catapres 0.1 mg Tablet] 1 tab PO TID 16 


Atorvastatin Calcium 10 mg PO DAILY 16 


Hydralazine HCl 100 mg PO TID 16 


Atorvastatin Calcium [Lipitor 10 mg Tablet] 1 tab PO DAILY 16 


Calcium Acetate [Phoslo 667 mg Capsule] 2 cap PO TID 16 


Insulin Aspart [Novolog Insulin (Aspart) 100 unit/mL] 10 units SUBCUT ASDIR PRN 

16 


Losartan Potassium 100 mg PO DAILY 16 


Diphenhydramine HCl [Benadryl 25 mg Capsule] 25 mg PO Q6HP PRN  capsule 16




Levofloxacin [Levaquin 500 mg Tablet] 500 mg PO Q48HS #5 tablet 16 


Sodium Polystyrene Sulfonate [Sps] 15 gm PO ASDIR PRN #1 bottle 16 


Morphine Sulfate [Morphine Ir 15 Mg Tablet] 15 mg PO TID #6 tablet 19 








Allergies/Adverse Reactions: 


                                        





propoxyphene napsylate [From Darvocet-N 100] Allergy (Severe, Verified 16 

09:00)


   n and v,dizziness,coughing


tuberculin, purified protein deriva [Tuberculin,Purif.Prot.Deriv.] Allergy 

(Severe, Verified 16 09:00)


   PPD CONVERTER


sulfamethoxazole [From Bactrim] Allergy (Verified 19 18:59)


   


trimethoprim [From Bactrim] Allergy (Verified 19 18:59)


   


hydrocodone bitartrate [From Vicodin] Adverse Reaction (Unknown, Verified 

16 09:00)


   Nausea


oxycodone HCl [From Percocet] Adverse Reaction (Unknown, Verified 16 

09:00)


   Nausea











Review of Systems


Constitutional: ABSENT: chills, fever(s), headache(s), weight gain, weight loss


Eyes: ABSENT: visual disturbances


Ears: ABSENT: hearing changes


Cardiovascular: ABSENT: chest pain, dyspnea on exertion, edema, orthropnea, 

palpitations


Respiratory: ABSENT: cough, hemoptysis


Gastrointestinal: ABSENT: abdominal pain, constipation, diarrhea, hematemesis, 

hematochezia, nausea, vomiting


Genitourinary: ABSENT: dysuria, hematuria


Musculoskeletal: ABSENT: joint swelling


Integumentary: ABSENT: rash, wounds


Neurological: ABSENT: abnormal gait, abnormal speech, confusion, dizziness, 

focal weakness, syncope


Psychiatric: ABSENT: anxiety, depression, homidical ideation, suicidal ideation


Endocrine: ABSENT: cold intolerance, heat intolerance, polydipsia, polyuria


Hematologic/Lymphatic: ABSENT: easy bleeding, easy bruising





Physical Exam


Vital Signs: 


                                        











Temp Pulse Resp BP Pulse Ox


 


 99.7 F   112 H  27 H  115/71   97 


 


 19 03:24  19 21:03  19 04:06  19 04:06  19 04:06








                                 Intake & Output











 19





 11:59 11:59 11:59


 


Intake Total   1050


 


Balance   1050


 


Weight   97.9 kg











General appearance: PRESENT: cooperative, mild distress, obese, well-developed, 

well-nourished


Head exam: PRESENT: atraumatic, normocephalic


Eye exam: PRESENT: conjunctiva pink, EOMI, PERRLA.  ABSENT: scleral icterus


Ear exam: PRESENT: normal external ear exam


Mouth exam: PRESENT: moist, tongue midline


Neck exam: ABSENT: carotid bruit, JVD, lymphadenopathy, thyromegaly


Respiratory exam: PRESENT: clear to auscultation danielle.  ABSENT: rales, rhonchi, 

wheezes


Cardiovascular exam: PRESENT: RRR.  ABSENT: diastolic murmur, rubs, systolic 

murmur


Pulses: PRESENT: normal dorsalis pedis pul


Vascular exam: PRESENT: normal capillary refill


GI/Abdominal exam: PRESENT: normal bowel sounds, soft, tenderness - Left flank. 

ABSENT: distended, guarding, mass, organolmegaly, rebound


Rectal exam: PRESENT: deferred


Extremities exam: PRESENT: full ROM.  ABSENT: calf tenderness, clubbing, pedal 

edema


Neurological exam: PRESENT: alert, awake, oriented to person, oriented to place,

oriented to time, oriented to situation, CN II-XII grossly intact.  ABSENT: 

motor sensory deficit


Psychiatric exam: PRESENT: appropriate affect, normal mood.  ABSENT: homicidal 

ideation, suicidal ideation


Skin exam: PRESENT: dry, intact, warm.  ABSENT: cyanosis, rash





Results


Laboratory Results: 


                                        





                                 19 01:13 





                                 19 01:13 





                                        











  19





  01:13 01:13 01:13


 


WBC  8.1  


 


RBC  5.22  


 


Hgb  14.4  


 


Hct  44.9  


 


MCV  86  


 


MCH  27.6  


 


MCHC  32.0  


 


RDW  14.4 H  


 


Plt Count  159  


 


Seg Neutrophils %  87.6 H  


 


Lymphocytes %  9.0 L  


 


Monocytes %  3.0  


 


Eosinophils %  0.2  


 


Basophils %  0.2  


 


Absolute Neutrophils  7.1  


 


Absolute Lymphocytes  0.7  


 


Absolute Monocytes  0.2  


 


Absolute Eosinophils  0.0  


 


Absolute Basophils  0.0  


 


VBG pH   


 


VBG pCO2   


 


VBG HCO3   


 


VBG Base Excess   


 


Sodium   141.4 


 


Potassium   4.7 


 


Chloride   101 


 


Carbon Dioxide   29 


 


Anion Gap   11 


 


BUN   21 H 


 


Creatinine   1.12 


 


Est GFR ( Amer)   > 60 


 


Est GFR (Non-Af Amer)   52 L 


 


Glucose   125 H 


 


Lactic Acid    1.7


 


Calcium   9.8 


 


Total Bilirubin   1.3 


 


AST   38 H 


 


ALT   22 


 


Alkaline Phosphatase   73 


 


Total Protein   8.9 H 


 


Albumin   4.8 


 


Urine Color   


 


Urine Appearance   


 


Urine pH   


 


Ur Specific Gravity   


 


Urine Protein   


 


Urine Glucose (UA)   


 


Urine Ketones   


 


Urine Blood   


 


Urine Nitrite   


 


Ur Leukocyte Esterase   


 


Urine WBC (Auto)   


 


Urine RBC (Auto)   














  19





  01:13 03:24


 


WBC  


 


RBC  


 


Hgb  


 


Hct  


 


MCV  


 


MCH  


 


MCHC  


 


RDW  


 


Plt Count  


 


Seg Neutrophils %  


 


Lymphocytes %  


 


Monocytes %  


 


Eosinophils %  


 


Basophils %  


 


Absolute Neutrophils  


 


Absolute Lymphocytes  


 


Absolute Monocytes  


 


Absolute Eosinophils  


 


Absolute Basophils  


 


VBG pH  7.34 


 


VBG pCO2  57.1 


 


VBG HCO3  30.3 


 


VBG Base Excess  3.1 


 


Sodium  


 


Potassium  


 


Chloride  


 


Carbon Dioxide  


 


Anion Gap  


 


BUN  


 


Creatinine  


 


Est GFR ( Amer)  


 


Est GFR (Non-Af Amer)  


 


Glucose  


 


Lactic Acid  


 


Calcium  


 


Total Bilirubin  


 


AST  


 


ALT  


 


Alkaline Phosphatase  


 


Total Protein  


 


Albumin  


 


Urine Color   YELLOW


 


Urine Appearance   CLOUDY


 


Urine pH   6.0


 


Ur Specific Gravity   1.018


 


Urine Protein   30 H


 


Urine Glucose (UA)   NEGATIVE


 


Urine Ketones   25 H


 


Urine Blood   MODERATE H


 


Urine Nitrite   POSITIVE H


 


Ur Leukocyte Esterase   LARGE H


 


Urine WBC (Auto)   138


 


Urine RBC (Auto)   7











Impressions: 


                                        





Chest X-Ray  19 23:53


IMPRESSION:


 


No acute finding.


 


 


copyright  [employer]- All Rights Reserved


 














Assessment and Plan





- Diagnosis


(1) Pyelonephritis


Is this a current diagnosis for this admission?: Yes   


Plan: 


Complicated by immunosuppression and transplant kidney, continue empiric 

Rocephin and symptomatic management, follow-up ultrasound blood and urine 

culture








(2) Fever


Qualifiers: 


   Fever type: unspecified   Qualified Code(s): R50.9 - Fever, unspecified   


Is this a current diagnosis for this admission?: Yes   


Plan: 


Secondary to #1, symptomatic management








(3) Kidney transplant recipient


Is this a current diagnosis for this admission?: Yes   


Plan: 


Continue outpatient immunosuppression regimen, follow-up at [facility] transplant 

center as needed








- Time


Time Spent with patient: 25-34 minutes





- Inpatient Certification


Medical Necessity: Need Close Monitoring Due to Risk of Patient Decompensation

## 2019-07-06 NOTE — PDOC PROGRESS REPORT
Subjective


Progress Note for:: 07/06/19


Subjective:: 


This is a 49 yr old female with a PMH of renal and pancreatic transplant 2016 on

immunosuppression who presented with left sided flank pain, chills and some 

diarrhea. She was found to be febrile and has UTI on admission was admitted for 

acute pyelonephritis earlier today.





Upon encounter, she appears more comfortable.  She says that she did have 

dysuria at home.  She says she has some nausea feels better overall better this 

morning.  


Reason For Visit: 


UTI








Physical Exam


Vital Signs: 


                                        











Temp Pulse Resp BP Pulse Ox


 


 98.6 F   112 H  22 H  167/109 H  100 


 


 07/06/19 14:00  07/05/19 21:03  07/06/19 13:01  07/06/19 13:01  07/06/19 13:01








                                 Intake & Output











 07/05/19 07/06/19 07/07/19





 06:59 06:59 06:59


 


Intake Total  1050 1000


 


Balance  1050 1000


 


Weight  215 lb 13.321 oz 











General appearance: PRESENT: no acute distress, well-developed, well-nourished


Head exam: PRESENT: atraumatic, normocephalic


Eye exam: PRESENT: conjunctiva pink, EOMI, PERRLA.  ABSENT: scleral icterus


Ear exam: PRESENT: normal external ear exam


Neck exam: ABSENT: carotid bruit, JVD, lymphadenopathy, thyromegaly


Respiratory exam: PRESENT: clear to auscultation danielle.  ABSENT: rales, rhonchi, 

wheezes


Cardiovascular exam: PRESENT: RRR.  ABSENT: diastolic murmur, rubs, systolic 

murmur


Pulses: PRESENT: normal dorsalis pedis pul


GI/Abdominal exam: PRESENT: normal bowel sounds, soft.  ABSENT: distended, 

guarding, mass, organolmegaly, rebound, tenderness


Rectal exam: PRESENT: deferred


Extremities exam: PRESENT: full ROM.  ABSENT: calf tenderness, clubbing, pedal 

edema


Neurological exam: PRESENT: alert, awake, oriented to person, oriented to place,

oriented to time, oriented to situation, CN II-XII grossly intact.  ABSENT: 

motor sensory deficit





Results


Laboratory Results: 


                                        





                                 07/06/19 01:13 





                                 07/06/19 01:13 





                                        











  07/06/19 07/06/19 07/06/19





  01:13 01:13 01:13


 


WBC  8.1  


 


RBC  5.22  


 


Hgb  14.4  


 


Hct  44.9  


 


MCV  86  


 


MCH  27.6  


 


MCHC  32.0  


 


RDW  14.4 H  


 


Plt Count  159  


 


Seg Neutrophils %  87.6 H  


 


Lymphocytes %  9.0 L  


 


Monocytes %  3.0  


 


Eosinophils %  0.2  


 


Basophils %  0.2  


 


Absolute Neutrophils  7.1  


 


Absolute Lymphocytes  0.7  


 


Absolute Monocytes  0.2  


 


Absolute Eosinophils  0.0  


 


Absolute Basophils  0.0  


 


VBG pH   


 


VBG pCO2   


 


VBG HCO3   


 


VBG Base Excess   


 


Sodium   141.4 


 


Potassium   4.7 


 


Chloride   101 


 


Carbon Dioxide   29 


 


Anion Gap   11 


 


BUN   21 H 


 


Creatinine   1.12 


 


Est GFR ( Amer)   > 60 


 


Est GFR (Non-Af Amer)   52 L 


 


Glucose   125 H 


 


Lactic Acid    1.7


 


Calcium   9.8 


 


Total Bilirubin   1.3 


 


AST   38 H 


 


ALT   22 


 


Alkaline Phosphatase   73 


 


Total Protein   8.9 H 


 


Albumin   4.8 


 


Urine Color   


 


Urine Appearance   


 


Urine pH   


 


Ur Specific Gravity   


 


Urine Protein   


 


Urine Glucose (UA)   


 


Urine Ketones   


 


Urine Blood   


 


Urine Nitrite   


 


Ur Leukocyte Esterase   


 


Urine WBC (Auto)   


 


Urine RBC (Auto)   














  07/06/19 07/06/19





  01:13 03:24


 


WBC  


 


RBC  


 


Hgb  


 


Hct  


 


MCV  


 


MCH  


 


MCHC  


 


RDW  


 


Plt Count  


 


Seg Neutrophils %  


 


Lymphocytes %  


 


Monocytes %  


 


Eosinophils %  


 


Basophils %  


 


Absolute Neutrophils  


 


Absolute Lymphocytes  


 


Absolute Monocytes  


 


Absolute Eosinophils  


 


Absolute Basophils  


 


VBG pH  7.34 


 


VBG pCO2  57.1 


 


VBG HCO3  30.3 


 


VBG Base Excess  3.1 


 


Sodium  


 


Potassium  


 


Chloride  


 


Carbon Dioxide  


 


Anion Gap  


 


BUN  


 


Creatinine  


 


Est GFR ( Amer)  


 


Est GFR (Non-Af Amer)  


 


Glucose  


 


Lactic Acid  


 


Calcium  


 


Total Bilirubin  


 


AST  


 


ALT  


 


Alkaline Phosphatase  


 


Total Protein  


 


Albumin  


 


Urine Color   YELLOW


 


Urine Appearance   CLOUDY


 


Urine pH   6.0


 


Ur Specific Gravity   1.018


 


Urine Protein   30 H


 


Urine Glucose (UA)   NEGATIVE


 


Urine Ketones   25 H


 


Urine Blood   MODERATE H


 


Urine Nitrite   POSITIVE H


 


Ur Leukocyte Esterase   LARGE H


 


Urine WBC (Auto)   138


 


Urine RBC (Auto)   7











Impressions: 


                                        





Chest X-Ray  07/05/19 23:53


IMPRESSION:


 


No acute finding.


 


 


copyright 2011 [employer]- All Rights Reserved


 








Abdomen Ultrasound  07/06/19 00:00


IMPRESSION:


 


1.  No acute findings. Limitation.


2.  11 cm left lower abdominal transplant kidney.


3.  Nonvisualized transplant pancreas.


4.  Nonvisualized native pancreas.


 














Assessment and Plan





- Diagnosis


(1) Sepsis


Is this a current diagnosis for this admission?: Yes   


Plan: 


Secondary to acute pyelonephritis.  Present tachycardia and fever.  She is 

immunosuppressed.  Blood culture is growing gram-negative bacteria 2/2 so far.  

We will switch Rocephin to Zosyn for now and will de-escalate pending final 

culture results.  Urine culture pending.








(2) Pyelonephritis


Is this a current diagnosis for this admission?: Yes   


Plan: 


As per number 1.








(3) Kidney transplant recipient


Is this a current diagnosis for this admission?: Yes   


Plan: 


Will resume tacrolimus, mycophenolate and prednisone.








- Time


Time Spent with patient: 25-34 minutes

## 2019-07-06 NOTE — RADIOLOGY REPORT (SQ)
EXAM DESCRIPTION:

US ABDOMEN



COMPLETED DATE/TME:  07/06/2019 00:00



CLINICAL HISTORY:

49 years Female, pyelo



Comparison:May 22 2016



LIMITATIONS: Bowel gas artifact.



FINDINGS:

11 cm left lower abdominal transplant kidney appears unremarkable

in size, shape, echotexture, and vascularity.

A reported transplant pancreas in the right lower abdominal

quadrant is nonvisualized.



Gallbladder, negative sonographic Mtz's test,   liver, a

0.3-cm diameter common bile duct, no intrahepatic ductal

dilation, hepatopetal patent flow of the portal vein,  spleen,

nine-cm left kidney,8 -cm right kidney, obscured native pancreas,

visualized vasculature/abdominal aorta, and no significant

ascites appear otherwise unremarkable.



IMPRESSION:



1.  No acute findings. Limitation.

2.  11 cm left lower abdominal transplant kidney.

3.  Nonvisualized transplant pancreas.

4.  Nonvisualized native pancreas.

## 2019-07-07 LAB
ADD MANUAL DIFF: NO
ANION GAP SERPL CALC-SCNC: 6 MMOL/L (ref 5–19)
BASOPHILS # BLD AUTO: 0 10^3/UL (ref 0–0.2)
BASOPHILS NFR BLD AUTO: 0.1 % (ref 0–2)
BUN SERPL-MCNC: 18 MG/DL (ref 7–20)
CALCIUM: 7.9 MG/DL (ref 8.4–10.2)
CHLORIDE SERPL-SCNC: 108 MMOL/L (ref 98–107)
CO2 SERPL-SCNC: 25 MMOL/L (ref 22–30)
EOSINOPHIL # BLD AUTO: 0.1 10^3/UL (ref 0–0.6)
EOSINOPHIL NFR BLD AUTO: 1 % (ref 0–6)
ERYTHROCYTE [DISTWIDTH] IN BLOOD BY AUTOMATED COUNT: 14 % (ref 11.5–14)
GLUCOSE SERPL-MCNC: 145 MG/DL (ref 75–110)
HCT VFR BLD CALC: 34.2 % (ref 36–47)
HGB BLD-MCNC: 11 G/DL (ref 12–15.5)
LYMPHOCYTES # BLD AUTO: 0.7 10^3/UL (ref 0.5–4.7)
LYMPHOCYTES NFR BLD AUTO: 12.3 % (ref 13–45)
MCH RBC QN AUTO: 27.8 PG (ref 27–33.4)
MCHC RBC AUTO-ENTMCNC: 32.2 G/DL (ref 32–36)
MCV RBC AUTO: 86 FL (ref 80–97)
MONOCYTES # BLD AUTO: 0.8 10^3/UL (ref 0.1–1.4)
MONOCYTES NFR BLD AUTO: 12.9 % (ref 3–13)
NEUTROPHILS # BLD AUTO: 4.5 10^3/UL (ref 1.7–8.2)
NEUTS SEG NFR BLD AUTO: 73.7 % (ref 42–78)
PLATELET # BLD: 139 10^3/UL (ref 150–450)
POTASSIUM SERPL-SCNC: 4.4 MMOL/L (ref 3.6–5)
RBC # BLD AUTO: 3.96 10^6/UL (ref 3.72–5.28)
SODIUM SERPL-SCNC: 138.7 MMOL/L (ref 137–145)
TOTAL CELLS COUNTED % (AUTO): 100 %
WBC # BLD AUTO: 6.1 10^3/UL (ref 4–10.5)

## 2019-07-07 RX ADMIN — DOCUSATE SODIUM SCH: 100 CAPSULE, LIQUID FILLED ORAL at 10:07

## 2019-07-07 RX ADMIN — HEPARIN SODIUM SCH UNIT: 5000 INJECTION, SOLUTION INTRAVENOUS; SUBCUTANEOUS at 21:04

## 2019-07-07 RX ADMIN — ASPIRIN SCH: 81 TABLET, COATED ORAL at 10:07

## 2019-07-07 RX ADMIN — ACETAMINOPHEN PRN MG: 325 TABLET ORAL at 21:54

## 2019-07-07 RX ADMIN — PIPERACILLIN AND TAZOBACTAM SCH MLS/HR: 3; .375 INJECTION, POWDER, LYOPHILIZED, FOR SOLUTION INTRAVENOUS; PARENTERAL at 12:11

## 2019-07-07 RX ADMIN — DOCUSATE SODIUM SCH: 100 CAPSULE, LIQUID FILLED ORAL at 17:30

## 2019-07-07 RX ADMIN — TACROLIMUS SCH MG: 1 CAPSULE ORAL at 21:04

## 2019-07-07 RX ADMIN — ACETAMINOPHEN PRN MG: 325 TABLET ORAL at 12:11

## 2019-07-07 RX ADMIN — DEXAMETHASONE SODIUM PHOSPHATE PRN MG: 10 INJECTION INTRAMUSCULAR; INTRAVENOUS at 10:02

## 2019-07-07 RX ADMIN — PREDNISONE SCH: 5 TABLET ORAL at 10:06

## 2019-07-07 RX ADMIN — HEPARIN SODIUM SCH: 5000 INJECTION, SOLUTION INTRAVENOUS; SUBCUTANEOUS at 05:21

## 2019-07-07 RX ADMIN — PIPERACILLIN AND TAZOBACTAM SCH MLS/HR: 3; .375 INJECTION, POWDER, LYOPHILIZED, FOR SOLUTION INTRAVENOUS; PARENTERAL at 05:23

## 2019-07-07 RX ADMIN — TACROLIMUS SCH: 1 CAPSULE ORAL at 10:07

## 2019-07-07 RX ADMIN — ACETAMINOPHEN PRN MG: 325 TABLET ORAL at 01:06

## 2019-07-07 RX ADMIN — ATORVASTATIN CALCIUM SCH MG: 10 TABLET, FILM COATED ORAL at 21:04

## 2019-07-07 RX ADMIN — SODIUM CHLORIDE PRN MLS/HR: 9 INJECTION, SOLUTION INTRAVENOUS at 01:08

## 2019-07-07 RX ADMIN — HEPARIN SODIUM SCH UNIT: 5000 INJECTION, SOLUTION INTRAVENOUS; SUBCUTANEOUS at 14:17

## 2019-07-07 RX ADMIN — PIPERACILLIN AND TAZOBACTAM SCH MLS/HR: 3; .375 INJECTION, POWDER, LYOPHILIZED, FOR SOLUTION INTRAVENOUS; PARENTERAL at 17:44

## 2019-07-07 NOTE — PDOC PROGRESS REPORT
Subjective


Progress Note for:: 07/07/19


Subjective:: 


This is a 49 yr old female with a PMH of renal and pancreatic transplant 2016 on

immunosuppression who presented with left sided flank pain, chills and some 

diarrhea. She was found to be febrile and has UTI on admission was admitted for 

acute pyelonephritis earlier today.





Upon encounter, she appears more comfortable.  She says that she did have 

dysuria at home.  She says she has some nausea feels better overall better this 

morning.  





7/7: No acute event overnight.  She appears more comfortable today and she 

continues to feel better.  Blood culture is growing gram-negative  rods 2/2.


Reason For Visit: 


UTI








Physical Exam


Vital Signs: 


                                        











Temp Pulse Resp BP Pulse Ox


 


 99.0 F   96   16   160/82 H  96 


 


 07/07/19 11:52  07/07/19 11:52  07/07/19 11:52  07/07/19 11:52  07/07/19 11:52








                                 Intake & Output











 07/06/19 07/07/19 07/08/19





 06:59 06:59 06:59


 


Intake Total 1050 4384 480


 


Balance 1050 4384 480


 


Weight 215 lb 13.321 oz 215 lb 13.321 oz 











General appearance: PRESENT: no acute distress, well-developed, well-nourished


Head exam: PRESENT: atraumatic, normocephalic


Eye exam: PRESENT: conjunctiva pink, EOMI, PERRLA.  ABSENT: scleral icterus


Ear exam: PRESENT: normal external ear exam


Mouth exam: PRESENT: moist, tongue midline


Neck exam: ABSENT: carotid bruit, JVD, lymphadenopathy, thyromegaly


Respiratory exam: PRESENT: clear to auscultation danielle.  ABSENT: rales, rhonchi, 

wheezes


Cardiovascular exam: PRESENT: RRR.  ABSENT: diastolic murmur, rubs, systolic 

murmur


Pulses: PRESENT: normal dorsalis pedis pul


GI/Abdominal exam: PRESENT: normal bowel sounds, soft.  ABSENT: distended, 

guarding, mass, organolmegaly, rebound, tenderness


Rectal exam: PRESENT: deferred


Extremities exam: PRESENT: full ROM.  ABSENT: calf tenderness, clubbing, pedal 

edema


Neurological exam: PRESENT: alert, awake, oriented to person, oriented to place,

oriented to time, oriented to situation, CN II-XII grossly intact.  ABSENT: 

motor sensory deficit





Results


Laboratory Results: 


                                        





                                 07/07/19 03:50 





                                 07/07/19 03:50 





                                        











  07/07/19 07/07/19





  03:50 03:50


 


WBC  6.1 


 


RBC  3.96 


 


Hgb  11.0 L D 


 


Hct  34.2 L 


 


MCV  86 


 


MCH  27.8 


 


MCHC  32.2 


 


RDW  14.0 


 


Plt Count  139 L 


 


Seg Neutrophils %  73.7 


 


Lymphocytes %  12.3 L 


 


Monocytes %  12.9 


 


Eosinophils %  1.0 


 


Basophils %  0.1 


 


Absolute Neutrophils  4.5 


 


Absolute Lymphocytes  0.7 


 


Absolute Monocytes  0.8 


 


Absolute Eosinophils  0.1 


 


Absolute Basophils  0.0 


 


Sodium   138.7


 


Potassium   4.4


 


Chloride   108 H


 


Carbon Dioxide   25


 


Anion Gap   6


 


BUN   18


 


Creatinine   1.09


 


Est GFR ( Amer)   > 60


 


Est GFR (Non-Af Amer)   54 L


 


Glucose   145 H


 


Calcium   7.9 L











Impressions: 


                                        





Chest X-Ray  07/05/19 23:53


IMPRESSION:


 


No acute finding.


 


 


copyright 2011 [employer]- All Rights Reserved


 








Abdomen Ultrasound  07/06/19 00:00


IMPRESSION:


 


1.  No acute findings. Limitation.


2.  11 cm left lower abdominal transplant kidney.


3.  Nonvisualized transplant pancreas.


4.  Nonvisualized native pancreas.


 














Assessment and Plan





- Diagnosis


(1) Sepsis


Is this a current diagnosis for this admission?: Yes   


Plan: 


Secondary to acute pyelonephritis.  Present tachycardia and fever.  She is 

immunosuppressed.  Blood culture is growing gram-negative bacteria 2/2 so far. 

Switched Rocephin to Zosyn and will de-escalate pending final culture results.  

Urine culture is also growing the same.








(2) Pyelonephritis


Is this a current diagnosis for this admission?: Yes   


Plan: 


As per number 1.








(3) Kidney transplant recipient


Is this a current diagnosis for this admission?: Yes   


Plan: 


Resumed tacrolimus, mycophenolate and prednisone.








- Time


Time Spent with patient: 25-34 minutes

## 2019-07-08 RX ADMIN — ATORVASTATIN CALCIUM SCH MG: 10 TABLET, FILM COATED ORAL at 21:16

## 2019-07-08 RX ADMIN — PREDNISONE SCH MG: 5 TABLET ORAL at 09:45

## 2019-07-08 RX ADMIN — PIPERACILLIN AND TAZOBACTAM SCH: 3; .375 INJECTION, POWDER, LYOPHILIZED, FOR SOLUTION INTRAVENOUS; PARENTERAL at 03:20

## 2019-07-08 RX ADMIN — DOCUSATE SODIUM SCH MG: 100 CAPSULE, LIQUID FILLED ORAL at 17:04

## 2019-07-08 RX ADMIN — CEFTRIAXONE SODIUM SCH MLS/HR: 1 INJECTION, POWDER, FOR SOLUTION INTRAMUSCULAR; INTRAVENOUS at 21:21

## 2019-07-08 RX ADMIN — TACROLIMUS SCH MG: 1 CAPSULE ORAL at 09:45

## 2019-07-08 RX ADMIN — HEPARIN SODIUM SCH UNIT: 5000 INJECTION, SOLUTION INTRAVENOUS; SUBCUTANEOUS at 13:39

## 2019-07-08 RX ADMIN — PIPERACILLIN AND TAZOBACTAM SCH MLS/HR: 3; .375 INJECTION, POWDER, LYOPHILIZED, FOR SOLUTION INTRAVENOUS; PARENTERAL at 12:33

## 2019-07-08 RX ADMIN — TACROLIMUS SCH MG: 1 CAPSULE ORAL at 21:16

## 2019-07-08 RX ADMIN — ACETAMINOPHEN PRN MG: 325 TABLET ORAL at 07:54

## 2019-07-08 RX ADMIN — ASPIRIN SCH MG: 81 TABLET, COATED ORAL at 09:44

## 2019-07-08 RX ADMIN — PIPERACILLIN AND TAZOBACTAM SCH MLS/HR: 3; .375 INJECTION, POWDER, LYOPHILIZED, FOR SOLUTION INTRAVENOUS; PARENTERAL at 05:14

## 2019-07-08 RX ADMIN — HEPARIN SODIUM SCH UNIT: 5000 INJECTION, SOLUTION INTRAVENOUS; SUBCUTANEOUS at 05:08

## 2019-07-08 RX ADMIN — HEPARIN SODIUM SCH: 5000 INJECTION, SOLUTION INTRAVENOUS; SUBCUTANEOUS at 21:21

## 2019-07-08 RX ADMIN — DOCUSATE SODIUM SCH MG: 100 CAPSULE, LIQUID FILLED ORAL at 09:44

## 2019-07-08 NOTE — PROGRESS NOTE
Provider Note


Provider Note: 


ID Consult Note





Asked to review patient's chart. Pt not seen or examined. Pt is a 49 year old 

woman with PMH including obesity and kidney and pancreas transplant on 

immunosuppressive medications in 2016 who presented on 7/6/19 to [facility] with 2.5 days of fever, generalized body aches, flank pain, n

ausea/vomiting and an episode of nonbloody diarrhea. She admitted to having some

dysuria at home. She was febrile and tachycardic on presentation with L flank 

tenderness on exam initially. She was found to have E coli bacteremia due to 

pyelonephritis with the same E coli also in her urine culture. Ultrasound of her

abdomen was limited by bowel gas but did not reveal any acute findings. CT of 

the abdomen and pelvis without contrast showed no acute findings in the abdomen,

no obstruction or suggestion of a perinephric collection. She defeverseced and 

has reported feeling better over the course of her stay. In her chart, Bactrim 

is listed as a medication to which she is allergic. The E coli isolate is 

reported susceptible by the lab to all agents tested.





Impression/Recommendations


E coli bacteremia secondary to pyelonephritis in renal transplant patient


- Currently on Zosyn, clinically improving, without other complication


- Can continue with Rocephin as an inpatient, rather than Zosyn, given the 

identity of the organism.  Can plan for PO switch at discharge to Cipro 500 mg 

BID as an outpatient to complete the remainder of treatment. The patient would 

need to be counseled on the need to schedule her PO magnesium for a time that 

does not interfere with Cipro.  Multivalent cations should be taken 2 hours 

before or 6 hours after Cipro to avoid chelating Cipro.  If the magnesium 

supplement is not necessary, it could be held until treatment is completed and 

then resumed. Bacteremic pyelonephritis can be treated with a fluoroquinolone 

for as short as 7 days duration, but considering her immunosuppression, aiming 

for completion of 14 days appears to be reasonable.





Stephon Strange MD


[facility] Infectious Diseases


pager [phone]

## 2019-07-08 NOTE — RADIOLOGY REPORT (SQ)
EXAM DESCRIPTION:  CT ABD/PELVIS NO ORAL OR IV



COMPLETED DATE/TIME:  7/8/2019 10:29 am



REASON FOR STUDY:  acute pyelo,flank pain



COMPARISON:  CT abdomen pelvis 7/15/2015, 12/17/2014

Abdominal ultrasound 7/6/2019



TECHNIQUE:  CT scan of the abdomen and pelvis performed without intravenous or oral contrast. Images 
reviewed with lung, soft tissue, and bone windows. Reconstructed coronal and sagittal MPR images revi
ewed. All images stored on PACS.

All CT scanners at this facility use dose modulation, iterative reconstruction, and/or weight based d
osing when appropriate to reduce radiation dose to as low as reasonably achievable (ALARA).

[facility]: Dose Right  [facility]: CareDose    [facility]: Dose Right    [facility]: Teradose 4D    [facility]: Smart Stage I Diagnostics



RADIATION DOSE:  CT Rad equipment meets quality standard of care and radiation dose reduction techniq
ues were employed. CTDIvol: 18.5 mGy. DLP: 980 mGy-cm.mGy.



LIMITATIONS:  None.



FINDINGS:  Native kidneys are small bilaterally, 8 cm in length.  No cysts, stones, masses, or hydron
ephrosis.  No right or left native ureteral calculi.

11 cm left lower quadrant renal transplant.  No hydronephrosis or stones.  No cysts or masses.  No pe
rinephric fluid collection around the left lower quadrant transplant kidney.

Urinary bladder is decompressed.  No bladder calculi.

Right lower quadrant pancreas transplant is present, without surrounding inflammatory change.

LOWER CHEST: No significant findings. No nodules or infiltrates.

NON-CONTRASTED LIVER, SPLEEN, ADRENALS: Evaluation limited by lack of IV contrast. No identified sign
ificant masses.

PANCREAS: Native pancreas unremarkable.  Right lower quadrant pancreas transplant is present, without
 inflammatory change.

GALLBLADDER: Contracted, not well seen

AORTA AND RETROPERITONEUM: No aneurysm. No retroperitoneal masses or adenopathy.

BOWEL AND PERITONEAL CAVITY: Large amount of stool throughout the colon.  Grossly nonobstructive checo
l gas pattern.  No free intraperitoneal air or fluid

APPENDIX: Surgically absent

PELVIS, BLADDER, AND ABDOMINAL WALL:Left lower quadrant pelvic transplant kidney.  Right lower quadra
nt pancreatic transplant.  Post hysterectomy.  No free pelvic fluid.  Bladder decompressed.  Rectum u
nremarkable.

BONES: No significant findings.

OTHER: No other significant finding.



IMPRESSION:  NO SIGNIFICANT OR ACUTE PROCESS IN THE ABDOMEN OR PELVIS.



COMMENT:  Quality ID # [id number]: Final reports with documentation of one or more dose reduction techniques
 (e.g., Automated exposure control, adjustment of the mA and/or kV according to patient size, use of 
iterative reconstruction technique)



TECHNICAL DOCUMENTATION:  JOB ID:  [id number]

 2011 [employer]- All Rights Reserved



Reading location - [facility]station [facility]

## 2019-07-08 NOTE — PDOC PROGRESS REPORT
Subjective


Progress Note for:: 07/08/19


Subjective:: 


This is a 49 yr old female with a PMH of renal and pancreatic transplant 2016 on

immunosuppression who presented with left sided flank pain, chills and some 

diarrhea. She was found to be febrile and has UTI on admission was admitted for 

acute pyelonephritis earlier today.





Upon encounter, she appears more comfortable.  She says that she did have 

dysuria at home.  She says she has some nausea feels better overall better this 

morning.  





7/7:  She appears more comfortable today and she continues to feel better.  

Blood culture is growing gram-negative  rods 2/2.





7/8: No acute event overnight.  She complained of mild flank pain this morning. 

No fever chills.  Blood culture came back positive for E. coli 2/2.  Urine also 

culture also grew the same.  Appreciate ID recommendations.  Will switch Zosyn 

to Rocephin.  Anticipate discharge with oral antibiotics in the next 24 hours if

she continues to do better.


Reason For Visit: 


UTI








Physical Exam


Vital Signs: 


                                        











Temp Pulse Resp BP Pulse Ox


 


 97.6 F   87   14   153/86 H  98 


 


 07/08/19 12:29  07/08/19 14:00  07/08/19 12:29  07/08/19 12:29  07/08/19 12:29








                                 Intake & Output











 07/07/19 07/08/19 07/09/19





 06:59 06:59 06:59


 


Intake Total 4384 1420 100


 


Balance 4384 1420 100


 


Weight 215 lb 13.321 oz 215 lb 13.321 oz 











General appearance: PRESENT: no acute distress, well-developed, well-nourished


Head exam: PRESENT: atraumatic, normocephalic


Eye exam: PRESENT: conjunctiva pink, EOMI, PERRLA.  ABSENT: scleral icterus


Ear exam: PRESENT: normal external ear exam


Mouth exam: PRESENT: moist, tongue midline


Neck exam: ABSENT: carotid bruit, JVD, lymphadenopathy, thyromegaly


Respiratory exam: PRESENT: clear to auscultation danielle.  ABSENT: rales, rhonchi, 

wheezes


Cardiovascular exam: PRESENT: RRR.  ABSENT: diastolic murmur, rubs, systolic 

murmur


Pulses: PRESENT: normal dorsalis pedis pul


GI/Abdominal exam: PRESENT: normal bowel sounds, soft.  ABSENT: distended, 

guarding, mass, organolmegaly, rebound, tenderness


Rectal exam: PRESENT: deferred


Extremities exam: PRESENT: full ROM.  ABSENT: calf tenderness, clubbing, pedal 

edema


Neurological exam: PRESENT: alert, awake, oriented to person, oriented to place,

oriented to time, oriented to situation, CN II-XII grossly intact.  ABSENT: 

motor sensory deficit





Results


Laboratory Results: 


                                        





                                 07/07/19 03:50 





                                 07/07/19 03:50 





                                        





07/06/19 01:13   Blood   Blood Culture - Final


                            Escherichia Coli


07/06/19 02:06   Blood   Blood Culture - Final


                            Escherichia Coli


07/06/19 03:24   Catheterized Urine   Urine Culture - Final


                            Escherichia Coli








Impressions: 


                                        





Chest X-Ray  07/05/19 23:53


IMPRESSION:


 


No acute finding.


 


 


copyright 2011 [employer]- All Rights Reserved


 








Abdomen Ultrasound  07/06/19 00:00


IMPRESSION:


 


1.  No acute findings. Limitation.


2.  11 cm left lower abdominal transplant kidney.


3.  Nonvisualized transplant pancreas.


4.  Nonvisualized native pancreas.


 








Abdomen/Pelvis CT  07/08/19 09:56


IMPRESSION:  NO SIGNIFICANT OR ACUTE PROCESS IN THE ABDOMEN OR PELVIS.


 














Assessment and Plan





- Diagnosis


(1) Sepsis


Is this a current diagnosis for this admission?: Yes   


Plan: 


Secondary to acute pyelonephritis.  Present tachycardia and fever.  She is 

immunosuppressed.  Blood culture is growing gram-negative bacteria 2/2 so far. 

Switched Rocephin to Zosyn and will de-escalate pending final culture results.  

Urine culture is also growing the same.





7/8: Blood culture came back positive for E. coli 2/2.  Urine also culture also 

grew the same.  Appreciate ID recommendations.  Will switch Zosyn to Rocephin.  

Anticipate discharge with oral antibiotics in the next 24 hours if she continues

to do better.








(2) Pyelonephritis


Is this a current diagnosis for this admission?: Yes   


Plan: 


As per number 1.








(3) Kidney transplant recipient


Is this a current diagnosis for this admission?: Yes   


Plan: 


Resumed tacrolimus, mycophenolate and prednisone.








(4) Immunosuppressed status


Is this a current diagnosis for this admission?: Yes   





- Time


Time Spent with patient: 25-34 minutes

## 2019-07-09 RX ADMIN — DOCUSATE SODIUM SCH: 100 CAPSULE, LIQUID FILLED ORAL at 17:15

## 2019-07-09 RX ADMIN — HEPARIN SODIUM SCH: 5000 INJECTION, SOLUTION INTRAVENOUS; SUBCUTANEOUS at 05:23

## 2019-07-09 RX ADMIN — ATORVASTATIN CALCIUM SCH: 10 TABLET, FILM COATED ORAL at 21:28

## 2019-07-09 RX ADMIN — HEPARIN SODIUM SCH: 5000 INJECTION, SOLUTION INTRAVENOUS; SUBCUTANEOUS at 21:28

## 2019-07-09 RX ADMIN — HEPARIN SODIUM SCH: 5000 INJECTION, SOLUTION INTRAVENOUS; SUBCUTANEOUS at 13:28

## 2019-07-09 RX ADMIN — ASPIRIN SCH MG: 81 TABLET, COATED ORAL at 09:25

## 2019-07-09 RX ADMIN — PREDNISONE SCH MG: 5 TABLET ORAL at 09:26

## 2019-07-09 RX ADMIN — DEXAMETHASONE SODIUM PHOSPHATE PRN MG: 10 INJECTION INTRAMUSCULAR; INTRAVENOUS at 09:30

## 2019-07-09 RX ADMIN — ACETAMINOPHEN PRN MG: 325 TABLET ORAL at 05:21

## 2019-07-09 RX ADMIN — CEFTRIAXONE SODIUM SCH MLS/HR: 1 INJECTION, POWDER, FOR SOLUTION INTRAMUSCULAR; INTRAVENOUS at 21:08

## 2019-07-09 RX ADMIN — DOCUSATE SODIUM SCH MG: 100 CAPSULE, LIQUID FILLED ORAL at 09:25

## 2019-07-09 RX ADMIN — TACROLIMUS SCH: 1 CAPSULE ORAL at 21:28

## 2019-07-09 RX ADMIN — ACETAMINOPHEN PRN MG: 325 TABLET ORAL at 21:14

## 2019-07-09 RX ADMIN — TACROLIMUS SCH MG: 1 CAPSULE ORAL at 09:26

## 2019-07-09 NOTE — PDOC PROGRESS REPORT
Subjective


Progress Note for:: 07/09/19


Subjective:: 





Patient denies recent fever or chills.  She continues to tolerate the 

antibiotics.  She reports renal transplant and pancreatic transplant personally 

3 years ago.  This is her first infection that has led her to the hospital she 

has been on chronic immunosuppression since then.  Blood cultures negative x24 

hours.


Reason For Visit: 


UTI








Physical Exam


Vital Signs: 


                                        











Temp Pulse Resp BP Pulse Ox


 


 97.5 F   82   14   133/83 H  100 


 


 07/09/19 11:26  07/09/19 14:00  07/09/19 08:03  07/09/19 11:26  07/09/19 11:26








                                 Intake & Output











 07/08/19 07/09/19 07/10/19





 06:59 06:59 06:59


 


Intake Total 1420 1450 


 


Balance 1420 1450 


 


Weight 97.9 kg 83.8 kg 











General appearance: PRESENT: no acute distress, well-developed, well-nourished


Head exam: PRESENT: atraumatic, normocephalic


Eye exam: PRESENT: conjunctiva pink, EOMI, PERRLA.  ABSENT: scleral icterus


Ear exam: PRESENT: normal external ear exam


Mouth exam: PRESENT: moist, tongue midline


Respiratory exam: PRESENT: clear to auscultation danielle.  ABSENT: rales, rhonchi, 

wheezes


Cardiovascular exam: PRESENT: RRR.  ABSENT: diastolic murmur, rubs, systolic 

murmur


GI/Abdominal exam: PRESENT: normal bowel sounds, soft.  ABSENT: distended, 

guarding, mass, organolmegaly, rebound, tenderness


Rectal exam: PRESENT: deferred


Extremities exam: PRESENT: other - Trace edema bilateral lower extremities


Musculoskeletal exam: PRESENT: full ROM


Neurological exam: PRESENT: alert, awake, oriented to person, oriented to place,

oriented to time, oriented to situation, CN II-XII grossly intact.  ABSENT: 

motor sensory deficit


Psychiatric exam: PRESENT: appropriate affect, normal mood.  ABSENT: homicidal 

ideation, suicidal ideation


Focused psych exam: ABSENT: catatonic, delusional, euphoric, flight of ideas, 

internal stimuli, paranoid, pressured speech, psychomotor agitation, 

restlessness, other


Skin exam: PRESENT: dry, intact, warm.  ABSENT: cyanosis, rash





Results


Laboratory Results: 


                                        





                                 07/07/19 03:50 





                                 07/07/19 03:50 





                                        











  07/08/19





  16:38


 


Magnesium  1.6











Impressions: 


                                        





Chest X-Ray  07/05/19 23:53


IMPRESSION:


 


No acute finding.


 


 


copyright 2011 [employer]- All Rights Reserved


 








Abdomen Ultrasound  07/06/19 00:00


IMPRESSION:


 


1.  No acute findings. Limitation.


2.  11 cm left lower abdominal transplant kidney.


3.  Nonvisualized transplant pancreas.


4.  Nonvisualized native pancreas.


 








Abdomen/Pelvis CT  07/08/19 09:56


IMPRESSION:  NO SIGNIFICANT OR ACUTE PROCESS IN THE ABDOMEN OR PELVIS.


 














Assessment and Plan





- Diagnosis


(1) Immunosuppressed status


Is this a current diagnosis for this admission?: Yes   


Plan: 


Chronic suppression due to renal and pancreatic transplant in 2016








(2) Pyelonephritis


Is this a current diagnosis for this admission?: Yes   


Plan: 


Likely origin of sepsis.  Continue with ID recommendations.





Impression/Recommendations


E coli bacteremia secondary to pyelonephritis in renal transplant patient


- Currently on Zosyn, clinically improving, without other complication


- Can continue with Rocephin as an inpatient, rather than Zosyn, given the 

identity of the organism.  Can plan for PO switch at discharge to Cipro 500 mg 

BID as an outpatient to complete the remainder of treatment. The patient would 

need to be counseled on the need to schedule her PO magnesium for a time that 

does not interfere with Cipro.  Multivalent cations should be taken 2 hours 

before or 6 hours after Cipro to avoid chelating Cipro.  If the magnesium 

supplement is not necessary, it could be held until treatment is completed and 

then resumed. Bacteremic pyelonephritis can be treated with a fluoroquinolone 

for as short as 7 days duration, but considering her immunosuppression, aiming 

for completion of 14 days appears to be reasonable.











(3) Sepsis


Is this a current diagnosis for this admission?: Yes   


Plan: 


No longer septic.  Patient is improving.  Vital signs stable.  Patient does not 

appear ill or toxic.








- Time


Time Spent with patient: 15-24 minutes


Medications reviewed and adjusted accordingly: Yes


Anticipated discharge: Home





- Inpatient Certification


Based on my medical assessment, after consideration of the patient's 

comorbidities, presenting symptoms, or acuity I expect that the services needed 

warrant INPATIENT care.: Yes


I certify that my determination is in accordance with my understanding of 

Medicare's requirements for reasonable and necessary INPATIENT services [42 CFR 

412.3e].: Yes





- Plan Summary


Plan Summary: 





Plan to discharge in the next 24 to 48 hours on oral Cipro with a 14-day course 

per infectious disease.

## 2019-07-10 RX ADMIN — ASPIRIN SCH MG: 81 TABLET, COATED ORAL at 09:00

## 2019-07-10 RX ADMIN — HEPARIN SODIUM SCH: 5000 INJECTION, SOLUTION INTRAVENOUS; SUBCUTANEOUS at 13:49

## 2019-07-10 RX ADMIN — ATORVASTATIN CALCIUM SCH: 10 TABLET, FILM COATED ORAL at 21:18

## 2019-07-10 RX ADMIN — PREDNISONE SCH MG: 5 TABLET ORAL at 09:00

## 2019-07-10 RX ADMIN — HEPARIN SODIUM SCH: 5000 INJECTION, SOLUTION INTRAVENOUS; SUBCUTANEOUS at 05:52

## 2019-07-10 RX ADMIN — TACROLIMUS SCH: 1 CAPSULE ORAL at 09:01

## 2019-07-10 RX ADMIN — DOCUSATE SODIUM SCH MG: 100 CAPSULE, LIQUID FILLED ORAL at 09:00

## 2019-07-10 RX ADMIN — TACROLIMUS SCH: 1 CAPSULE ORAL at 21:18

## 2019-07-10 RX ADMIN — DOCUSATE SODIUM SCH MG: 100 CAPSULE, LIQUID FILLED ORAL at 17:18

## 2019-07-10 RX ADMIN — HEPARIN SODIUM SCH: 5000 INJECTION, SOLUTION INTRAVENOUS; SUBCUTANEOUS at 21:18

## 2019-07-10 RX ADMIN — CEFTRIAXONE SODIUM SCH MLS/HR: 1 INJECTION, POWDER, FOR SOLUTION INTRAMUSCULAR; INTRAVENOUS at 21:15

## 2019-07-11 VITALS — SYSTOLIC BLOOD PRESSURE: 104 MMHG | DIASTOLIC BLOOD PRESSURE: 67 MMHG

## 2019-07-11 LAB
ADD MANUAL DIFF: NO
ALBUMIN SERPL-MCNC: 3.2 G/DL (ref 3.5–5)
ALP SERPL-CCNC: 57 U/L (ref 38–126)
ALT SERPL-CCNC: 56 U/L (ref 9–52)
ANION GAP SERPL CALC-SCNC: 7 MMOL/L (ref 5–19)
AST SERPL-CCNC: 53 U/L (ref 14–36)
BASOPHILS # BLD AUTO: 0 10^3/UL (ref 0–0.2)
BASOPHILS NFR BLD AUTO: 0.2 % (ref 0–2)
BILIRUB DIRECT SERPL-MCNC: 0.2 MG/DL (ref 0–0.4)
BILIRUB SERPL-MCNC: 0.3 MG/DL (ref 0.2–1.3)
BUN SERPL-MCNC: 21 MG/DL (ref 7–20)
CALCIUM: 8.7 MG/DL (ref 8.4–10.2)
CHLORIDE SERPL-SCNC: 107 MMOL/L (ref 98–107)
CO2 SERPL-SCNC: 24 MMOL/L (ref 22–30)
EOSINOPHIL # BLD AUTO: 0.1 10^3/UL (ref 0–0.6)
EOSINOPHIL NFR BLD AUTO: 1.4 % (ref 0–6)
ERYTHROCYTE [DISTWIDTH] IN BLOOD BY AUTOMATED COUNT: 13.9 % (ref 11.5–14)
GLUCOSE SERPL-MCNC: 129 MG/DL (ref 75–110)
HCT VFR BLD CALC: 36.7 % (ref 36–47)
HGB BLD-MCNC: 11.8 G/DL (ref 12–15.5)
LYMPHOCYTES # BLD AUTO: 1.2 10^3/UL (ref 0.5–4.7)
LYMPHOCYTES NFR BLD AUTO: 25.3 % (ref 13–45)
MCH RBC QN AUTO: 27.6 PG (ref 27–33.4)
MCHC RBC AUTO-ENTMCNC: 32.1 G/DL (ref 32–36)
MCV RBC AUTO: 86 FL (ref 80–97)
MONOCYTES # BLD AUTO: 0.6 10^3/UL (ref 0.1–1.4)
MONOCYTES NFR BLD AUTO: 11.7 % (ref 3–13)
NEUTROPHILS # BLD AUTO: 3 10^3/UL (ref 1.7–8.2)
NEUTS SEG NFR BLD AUTO: 61.4 % (ref 42–78)
PLATELET # BLD: 227 10^3/UL (ref 150–450)
POTASSIUM SERPL-SCNC: 4.5 MMOL/L (ref 3.6–5)
PROT SERPL-MCNC: 6.2 G/DL (ref 6.3–8.2)
RBC # BLD AUTO: 4.27 10^6/UL (ref 3.72–5.28)
SODIUM SERPL-SCNC: 138 MMOL/L (ref 137–145)
TOTAL CELLS COUNTED % (AUTO): 100 %
WBC # BLD AUTO: 4.9 10^3/UL (ref 4–10.5)

## 2019-07-11 RX ADMIN — TACROLIMUS SCH: 1 CAPSULE ORAL at 09:39

## 2019-07-11 RX ADMIN — PREDNISONE SCH: 5 TABLET ORAL at 09:39

## 2019-07-11 RX ADMIN — HEPARIN SODIUM SCH: 5000 INJECTION, SOLUTION INTRAVENOUS; SUBCUTANEOUS at 05:09

## 2019-07-11 RX ADMIN — DOCUSATE SODIUM SCH MG: 100 CAPSULE, LIQUID FILLED ORAL at 09:40

## 2019-07-11 RX ADMIN — ASPIRIN SCH: 81 TABLET, COATED ORAL at 09:39

## 2019-07-11 NOTE — PDOC DISCHARGE SUMMARY
General





- Admit/Disc Date/PCP


Admission Date/Primary Care Provider: 


  07/06/19 04:54





  





Discharge Date: 07/11/19





- Discharge Diagnosis


(1) Immunosuppressed status


Is this a current diagnosis for this admission?: Yes   


Summary: 


continued on home meds on admission. s/p renal and pancreatic transplant 3 years

ago. has labs drawn q 6 weeks








(2) Pyelonephritis


Is this a current diagnosis for this admission?: Yes   


Summary: 


received zosyn and rocephin while admitted. treatment plan was changed to 

ceftriaxone only per ID recommendations. on d/c she will be given a 14 day 

script of 500 mg bid cipro to continue taking. her course is longer given her 

immune state








(3) UTI (urinary tract infection)


Is this a current diagnosis for this admission?: Yes   


Summary: 


see above. continue abx








(4) Bacteremia


Is this a current diagnosis for this admission?: Yes   


Summary: 


1/2 + e.coli. she may have received abx prior to blood cultures which would 

explain the results. no systemic symptoms. vss. get pcp to check cultures post 

abx completion. 





Infectious Diseases Impression/Recommendations


E coli bacteremia secondary to pyelonephritis in renal transplant patient


- Currently on Zosyn, clinically improving, without other complication


- Can continue with Rocephin as an inpatient, rather than Zosyn, given the 

identity of the organism.  Can plan for PO switch at discharge to Cipro 500 mg 

BID as an outpatient to complete the remainder of treatment. The patient would 

need to be counseled on the need to schedule her PO magnesium for a time that 

does not interfere with Cipro.  Multivalent cations should be taken 2 hours 

before or 6 hours after Cipro to avoid chelating Cipro.  If the magnesium 

supplement is not necessary, it could be held until treatment is completed and 

then resumed. Bacteremic pyelonephritis can be treated with a fluoroquinolone 

for as short as 7 days duration, but considering her immunosuppression, aiming 

for completion of 14 days appears to be reasonable.








- Additional Information


Resuscitation Status: Full Code


Discharge Diet: As Tolerated


Discharge Activity: Activity As Tolerated


Prescriptions: 


Ciprofloxacin HCl [Cipro 500 mg Tablet] 500 mg PO BID 14 Days #28 tablet


Home Medications: 








Albuterol Sulfate [Proair HFA Inhalation Aerosol 8.5 gm MDI] 2 puff IH Q6HP PRN 

07/06/19 


Aspirin [Ecotrin 81 mg EC Tablet] 81 mg PO DAILY@0900 07/06/19 


Atorvastatin Calcium [Lipitor 10 mg Tablet] 10 mg PO QHS@2100 07/06/19 


Brimonidine Tartrate/Timolol [Combigan 0.2%-0.5% Eye Drops] 1 drop OU 

Q12@0900,2100 07/06/19 


Latanoprost [Xalatan 0.005% Oph Soln 2.5 ml] 1 drop PO QHS@2100 07/06/19 


Magnesium Oxide [Mag-Ox 400 mg Tablet] 400 mg PO BID@1300,1700 07/06/19 


Mycophenolate Sodium [Myfortic 180 mg Tablet.dr] 540 mg PO Q12@0900,2100 07/06/19 


Prednisone [Deltasone 5 mg Tablet] 5 mg PO DAILY@0900 07/06/19 


Tacrolimus [Prograf] 4 mg PO QHS@2100 07/06/19 


Tacrolimus [Prograf] 5 mg PO DAILY@0900 07/06/19 


Ciprofloxacin HCl [Cipro 500 mg Tablet] 500 mg PO BID 14 Days #28 tablet 

07/11/19 











History of Present Illness


Patient complains of: uti


History of Present Illness: 


OMAR HARTMAN is a 49 year old female with past medical history of renal 

and pancreatic transplant 2016 on immunosuppression.  She presents 2 and half 

days after the onset of left-sided flank pain nausea without vomiting and some 

diarrhea without blood.  In the emergency room she is found to have fever, 

tachycardia, pyuria.  Her transplant team at [facility] is consulted recommending 

hospitalization with empiric antibiotics.  She started on Rocephin and referred 

to the hospitalist for admission.








Hospital Course


Hospital Course: 


see above








Physical Exam


Vital Signs: 


                                        











Temp Pulse Resp BP Pulse Ox


 


 97.8 F   81   19   104/67   100 


 


 07/11/19 08:00  07/11/19 08:00  07/11/19 08:00  07/11/19 08:00  07/11/19 08:00








                                 Intake & Output











 07/10/19 07/11/19 07/12/19





 06:59 06:59 06:59


 


Intake Total 440 987 


 


Balance 440 987 


 


Weight 83.3 kg 84 kg 











General appearance: PRESENT: no acute distress, well-developed, well-nourished


Head exam: PRESENT: atraumatic, normocephalic


Eye exam: PRESENT: conjunctiva pink, EOMI, PERRLA.  ABSENT: scleral icterus


Ear exam: PRESENT: normal external ear exam


Mouth exam: PRESENT: moist, tongue midline


Neck exam: ABSENT: carotid bruit, JVD, lymphadenopathy, thyromegaly


Respiratory exam: PRESENT: clear to auscultation danielle.  ABSENT: rales, rhonchi, 

wheezes


Cardiovascular exam: PRESENT: RRR.  ABSENT: diastolic murmur, rubs, systolic 

murmur


Pulses: PRESENT: normal dorsalis pedis pul


Vascular exam: PRESENT: normal capillary refill


GI/Abdominal exam: PRESENT: normal bowel sounds, soft.  ABSENT: distended, 

guarding, mass, organolmegaly, rebound, tenderness


Rectal exam: PRESENT: deferred


Extremities exam: PRESENT: full ROM.  ABSENT: calf tenderness, clubbing, pedal 

edema


Neurological exam: PRESENT: alert, awake, oriented to person, oriented to place,

oriented to time, oriented to situation, CN II-XII grossly intact.  ABSENT: 

motor sensory deficit


Psychiatric exam: PRESENT: appropriate affect, normal mood.  ABSENT: homicidal 

ideation, suicidal ideation


Skin exam: PRESENT: dry, intact, warm.  ABSENT: cyanosis, rash





Results


Laboratory Results: 


                                        





                                 07/11/19 06:24 





                                 07/11/19 05:12 





                                        











  07/11/19 07/11/19 07/11/19





  05:12 05:12 06:24


 


WBC  Cancelled   4.9


 


RBC  Cancelled   4.27


 


Hgb  Cancelled   11.8 L


 


Hct  Cancelled   36.7


 


MCV  Cancelled   86


 


MCH  Cancelled   27.6


 


MCHC  Cancelled   32.1


 


RDW  Cancelled   13.9


 


Plt Count  Cancelled   227


 


Seg Neutrophils %  Cancelled   61.4


 


Lymphocytes %  Cancelled   25.3


 


Monocytes %  Cancelled   11.7


 


Eosinophils %  Cancelled   1.4


 


Basophils %  Cancelled   0.2


 


Absolute Neutrophils  Cancelled   3.0


 


Absolute Lymphocytes  Cancelled   1.2


 


Absolute Monocytes  Cancelled   0.6


 


Absolute Eosinophils  Cancelled   0.1


 


Absolute Basophils  Cancelled   0.0


 


Sodium   138.0 


 


Potassium   4.5 


 


Chloride   107 


 


Carbon Dioxide   24 


 


Anion Gap   7 


 


BUN   21 H 


 


Creatinine   0.82 


 


Est GFR ( Amer)   > 60 


 


Est GFR (Non-Af Amer)   > 60 


 


Glucose   129 H 


 


Calcium   8.7 


 


Total Bilirubin   0.3 


 


AST   53 H 


 


ALT   56 H 


 


Alkaline Phosphatase   57 


 


Total Protein   6.2 L 


 


Albumin   3.2 L 











Impressions: 


                                        





Chest X-Ray  07/05/19 23:53


IMPRESSION:


 


No acute finding.


 


 


copyright 2011 [employer]- All Rights Reserved


 








Abdomen Ultrasound  07/06/19 00:00


IMPRESSION:


 


1.  No acute findings. Limitation.


2.  11 cm left lower abdominal transplant kidney.


3.  Nonvisualized transplant pancreas.


4.  Nonvisualized native pancreas.


 








Abdomen/Pelvis CT  07/08/19 09:56


IMPRESSION:  NO SIGNIFICANT OR ACUTE PROCESS IN THE ABDOMEN OR PELVIS.


 














Qualifiers





- *


PATIENT BEING DISCHARGED WITH ANY OF THE FOLLOWING DIAGNOSIS: No





Acute Heart Failure





- **


Is this a Heart Failure Patient?: No





Plan


Discharge Plan: 





continue po cipro x 14 days. f/u with pcp in next 1-2 weeks. ask about post abx 

cultures. f/u with [facility] as scheduled, but call to make sure they do not want to 

see you before oct.

## 2019-09-12 ENCOUNTER — HOSPITAL ENCOUNTER (OUTPATIENT)
Dept: HOSPITAL 62 - OD | Age: 49
End: 2019-09-12
Payer: MEDICARE

## 2019-09-12 DIAGNOSIS — Z94.83: ICD-10-CM

## 2019-09-12 DIAGNOSIS — Z51.81: Primary | ICD-10-CM

## 2019-09-12 DIAGNOSIS — Z94.0: ICD-10-CM

## 2019-09-12 LAB
ADD MANUAL MICROSCOPIC: YES
AMYLASE SERPL-CCNC: 48 U/L (ref 30–110)
ANION GAP SERPL CALC-SCNC: 6 MMOL/L (ref 5–19)
APPEARANCE UR: CLEAR
APTT PPP: YELLOW S
BILIRUB UR QL STRIP: NEGATIVE
BUN SERPL-MCNC: 24 MG/DL (ref 7–20)
CALCIUM: 9.3 MG/DL (ref 8.4–10.2)
CHLORIDE SERPL-SCNC: 102 MMOL/L (ref 98–107)
CO2 SERPL-SCNC: 30 MMOL/L (ref 22–30)
CREAT UR-MCNC: 112.5 MG/DL (ref 15–278)
ERYTHROCYTE [DISTWIDTH] IN BLOOD BY AUTOMATED COUNT: 13.8 % (ref 11.5–14)
GLUCOSE SERPL-MCNC: 131 MG/DL (ref 75–110)
GLUCOSE UR STRIP-MCNC: NEGATIVE MG/DL
HCT VFR BLD CALC: 38 % (ref 36–47)
HGB BLD-MCNC: 12.2 G/DL (ref 12–15.5)
KETONES UR STRIP-MCNC: NEGATIVE MG/DL
MCH RBC QN AUTO: 27.8 PG (ref 27–33.4)
MCHC RBC AUTO-ENTMCNC: 32.2 G/DL (ref 32–36)
MCV RBC AUTO: 86 FL (ref 80–97)
NITRITE UR QL STRIP: NEGATIVE
PH UR STRIP: 6 [PH] (ref 5–9)
PLATELET # BLD: 159 10^3/UL (ref 150–450)
POTASSIUM SERPL-SCNC: 4.4 MMOL/L (ref 3.6–5)
PROT UR STRIP-MCNC: 7.3 MG/DL (ref ?–12)
PROT UR STRIP-MCNC: NEGATIVE MG/DL
RBC # BLD AUTO: 4.41 10^6/UL (ref 3.72–5.28)
RBC #/AREA URNS HPF: (no result) /HPF
SP GR UR STRIP: 1.02
UR PRO/CREAT RATIO RESULT: 0.1 MG/MG (ref 0–0.2)
UROBILINOGEN UR-MCNC: NEGATIVE MG/DL (ref ?–2)
WBC # BLD AUTO: 3.2 10^3/UL (ref 4–10.5)
WBC #/AREA URNS HPF: (no result) /HPF

## 2019-09-12 PROCEDURE — 82570 ASSAY OF URINE CREATININE: CPT

## 2019-09-12 PROCEDURE — 85027 COMPLETE CBC AUTOMATED: CPT

## 2019-09-12 PROCEDURE — 84156 ASSAY OF PROTEIN URINE: CPT

## 2019-09-12 PROCEDURE — 36415 COLL VENOUS BLD VENIPUNCTURE: CPT

## 2019-09-12 PROCEDURE — 80197 ASSAY OF TACROLIMUS: CPT

## 2019-09-12 PROCEDURE — 80048 BASIC METABOLIC PNL TOTAL CA: CPT

## 2019-09-12 PROCEDURE — 83690 ASSAY OF LIPASE: CPT

## 2019-09-12 PROCEDURE — 81001 URINALYSIS AUTO W/SCOPE: CPT

## 2019-09-12 PROCEDURE — 82150 ASSAY OF AMYLASE: CPT

## 2019-09-12 PROCEDURE — 87086 URINE CULTURE/COLONY COUNT: CPT

## 2019-09-18 ENCOUNTER — HOSPITAL ENCOUNTER (OUTPATIENT)
Dept: HOSPITAL 62 - WI | Age: 49
End: 2019-09-18
Attending: OBSTETRICS & GYNECOLOGY
Payer: MEDICARE

## 2019-09-18 DIAGNOSIS — Z12.31: Primary | ICD-10-CM

## 2019-09-18 PROCEDURE — 77063 BREAST TOMOSYNTHESIS BI: CPT

## 2019-09-18 PROCEDURE — 77067 SCR MAMMO BI INCL CAD: CPT

## 2020-07-02 ENCOUNTER — HOSPITAL ENCOUNTER (OUTPATIENT)
Dept: HOSPITAL 62 - OD | Age: 50
End: 2020-07-02
Attending: INTERNAL MEDICINE
Payer: MEDICARE

## 2020-07-02 DIAGNOSIS — Z51.81: ICD-10-CM

## 2020-07-02 DIAGNOSIS — Z94.0: ICD-10-CM

## 2020-07-02 DIAGNOSIS — B25.9: Primary | ICD-10-CM

## 2020-07-02 DIAGNOSIS — Z79.899: ICD-10-CM

## 2020-07-02 LAB
ADD MANUAL DIFF: NO
AMYLASE SERPL-CCNC: 49 U/L (ref 30–110)
ANION GAP SERPL CALC-SCNC: 5 MMOL/L (ref 5–19)
APPEARANCE UR: CLEAR
APTT PPP: YELLOW S
BASOPHILS # BLD AUTO: 0 10^3/UL (ref 0–0.2)
BASOPHILS NFR BLD AUTO: 0.2 % (ref 0–2)
BILIRUB UR QL STRIP: NEGATIVE
BUN SERPL-MCNC: 21 MG/DL (ref 7–20)
CALCIUM: 9.5 MG/DL (ref 8.4–10.2)
CHLORIDE SERPL-SCNC: 101 MMOL/L (ref 98–107)
CO2 SERPL-SCNC: 31 MMOL/L (ref 22–30)
CREAT UR-MCNC: 287.2 MG/DL (ref 15–278)
EOSINOPHIL # BLD AUTO: 0 10^3/UL (ref 0–0.6)
EOSINOPHIL NFR BLD AUTO: 1 % (ref 0–6)
ERYTHROCYTE [DISTWIDTH] IN BLOOD BY AUTOMATED COUNT: 13.4 % (ref 11.5–14)
GLUCOSE SERPL-MCNC: 135 MG/DL (ref 75–110)
GLUCOSE UR STRIP-MCNC: NEGATIVE MG/DL
HCT VFR BLD CALC: 39.7 % (ref 36–47)
HGB BLD-MCNC: 13.1 G/DL (ref 12–15.5)
KETONES UR STRIP-MCNC: NEGATIVE MG/DL
LYMPHOCYTES # BLD AUTO: 1.5 10^3/UL (ref 0.5–4.7)
LYMPHOCYTES NFR BLD AUTO: 36.6 % (ref 13–45)
MCH RBC QN AUTO: 28.6 PG (ref 27–33.4)
MCHC RBC AUTO-ENTMCNC: 33 G/DL (ref 32–36)
MCV RBC AUTO: 87 FL (ref 80–97)
MONOCYTES # BLD AUTO: 0.4 10^3/UL (ref 0.1–1.4)
MONOCYTES NFR BLD AUTO: 9.5 % (ref 3–13)
NEUTROPHILS # BLD AUTO: 2.1 10^3/UL (ref 1.7–8.2)
NEUTS SEG NFR BLD AUTO: 52.7 % (ref 42–78)
NITRITE UR QL STRIP: NEGATIVE
PH UR STRIP: 6 [PH] (ref 5–9)
PLATELET # BLD: 191 10^3/UL (ref 150–450)
POTASSIUM SERPL-SCNC: 4.3 MMOL/L (ref 3.6–5)
PROT UR STRIP-MCNC: 6.3 MG/DL (ref ?–12)
PROT UR STRIP-MCNC: NEGATIVE MG/DL
RBC # BLD AUTO: 4.57 10^6/UL (ref 3.72–5.28)
SP GR UR STRIP: 1.03
TOTAL CELLS COUNTED % (AUTO): 100 %
UR PRO/CREAT RATIO RESULT: 0 MG/MG (ref 0–0.2)
UROBILINOGEN UR-MCNC: 4 MG/DL (ref ?–2)
WBC # BLD AUTO: 4 10^3/UL (ref 4–10.5)

## 2020-07-02 PROCEDURE — 87496 CYTOMEG DNA AMP PROBE: CPT

## 2020-07-02 PROCEDURE — 82150 ASSAY OF AMYLASE: CPT

## 2020-07-02 PROCEDURE — 87799 DETECT AGENT NOS DNA QUANT: CPT

## 2020-07-02 PROCEDURE — 83690 ASSAY OF LIPASE: CPT

## 2020-07-02 PROCEDURE — 84156 ASSAY OF PROTEIN URINE: CPT

## 2020-07-02 PROCEDURE — 81001 URINALYSIS AUTO W/SCOPE: CPT

## 2020-07-02 PROCEDURE — 36415 COLL VENOUS BLD VENIPUNCTURE: CPT

## 2020-07-02 PROCEDURE — 85025 COMPLETE CBC W/AUTO DIFF WBC: CPT

## 2020-07-02 PROCEDURE — 80048 BASIC METABOLIC PNL TOTAL CA: CPT

## 2020-07-02 PROCEDURE — 82570 ASSAY OF URINE CREATININE: CPT

## 2020-07-02 PROCEDURE — 80197 ASSAY OF TACROLIMUS: CPT

## 2020-07-04 LAB — TACROLIMUS SERPL-MCNC: 9 NG/ML (ref 2–20)

## 2020-07-05 LAB — CMV DNA SERPL NAA+PROBE-LOG IU: (no result) {LOG_IU}/ML

## 2020-07-21 ENCOUNTER — HOSPITAL ENCOUNTER (OUTPATIENT)
Dept: HOSPITAL 62 - OD | Age: 50
End: 2020-07-21
Attending: INTERNAL MEDICINE
Payer: MEDICARE

## 2020-07-21 DIAGNOSIS — Z51.81: ICD-10-CM

## 2020-07-21 DIAGNOSIS — Z94.0: ICD-10-CM

## 2020-07-21 DIAGNOSIS — B25.9: Primary | ICD-10-CM

## 2020-07-21 DIAGNOSIS — Z79.899: ICD-10-CM

## 2020-07-21 LAB
ADD MANUAL DIFF: NO
AMYLASE SERPL-CCNC: 43 U/L (ref 30–110)
ANION GAP SERPL CALC-SCNC: 4 MMOL/L (ref 5–19)
APPEARANCE UR: (no result)
APTT PPP: YELLOW S
BASOPHILS # BLD AUTO: 0 10^3/UL (ref 0–0.2)
BASOPHILS NFR BLD AUTO: 0.3 % (ref 0–2)
BILIRUB UR QL STRIP: NEGATIVE
BUN SERPL-MCNC: 22 MG/DL (ref 7–20)
CALCIUM: 9.3 MG/DL (ref 8.4–10.2)
CHLORIDE SERPL-SCNC: 103 MMOL/L (ref 98–107)
CO2 SERPL-SCNC: 30 MMOL/L (ref 22–30)
CREAT UR-MCNC: 264.6 MG/DL (ref 15–278)
EOSINOPHIL # BLD AUTO: 0 10^3/UL (ref 0–0.6)
EOSINOPHIL NFR BLD AUTO: 1.2 % (ref 0–6)
ERYTHROCYTE [DISTWIDTH] IN BLOOD BY AUTOMATED COUNT: 13.7 % (ref 11.5–14)
GLUCOSE SERPL-MCNC: 128 MG/DL (ref 75–110)
GLUCOSE UR STRIP-MCNC: NEGATIVE MG/DL
HCT VFR BLD CALC: 40.4 % (ref 36–47)
HGB BLD-MCNC: 13.1 G/DL (ref 12–15.5)
KETONES UR STRIP-MCNC: NEGATIVE MG/DL
LYMPHOCYTES # BLD AUTO: 1.5 10^3/UL (ref 0.5–4.7)
LYMPHOCYTES NFR BLD AUTO: 40.6 % (ref 13–45)
MCH RBC QN AUTO: 28.3 PG (ref 27–33.4)
MCHC RBC AUTO-ENTMCNC: 32.5 G/DL (ref 32–36)
MCV RBC AUTO: 87 FL (ref 80–97)
MONOCYTES # BLD AUTO: 0.4 10^3/UL (ref 0.1–1.4)
MONOCYTES NFR BLD AUTO: 10.4 % (ref 3–13)
NEUTROPHILS # BLD AUTO: 1.7 10^3/UL (ref 1.7–8.2)
NEUTS SEG NFR BLD AUTO: 47.5 % (ref 42–78)
NITRITE UR QL STRIP: NEGATIVE
PH UR STRIP: 6 [PH] (ref 5–9)
PLATELET # BLD: 181 10^3/UL (ref 150–450)
POTASSIUM SERPL-SCNC: 4.3 MMOL/L (ref 3.6–5)
PROT UR STRIP-MCNC: 6.2 MG/DL (ref ?–12)
PROT UR STRIP-MCNC: NEGATIVE MG/DL
RBC # BLD AUTO: 4.63 10^6/UL (ref 3.72–5.28)
SP GR UR STRIP: 1.03
TOTAL CELLS COUNTED % (AUTO): 100 %
UR PRO/CREAT RATIO RESULT: 0 MG/MG (ref 0–0.2)
UROBILINOGEN UR-MCNC: 2 MG/DL (ref ?–2)
WBC # BLD AUTO: 3.6 10^3/UL (ref 4–10.5)

## 2020-07-21 PROCEDURE — 80048 BASIC METABOLIC PNL TOTAL CA: CPT

## 2020-07-21 PROCEDURE — 85025 COMPLETE CBC W/AUTO DIFF WBC: CPT

## 2020-07-21 PROCEDURE — 87799 DETECT AGENT NOS DNA QUANT: CPT

## 2020-07-21 PROCEDURE — 84156 ASSAY OF PROTEIN URINE: CPT

## 2020-07-21 PROCEDURE — 36415 COLL VENOUS BLD VENIPUNCTURE: CPT

## 2020-07-21 PROCEDURE — 87496 CYTOMEG DNA AMP PROBE: CPT

## 2020-07-21 PROCEDURE — 80197 ASSAY OF TACROLIMUS: CPT

## 2020-07-21 PROCEDURE — 81001 URINALYSIS AUTO W/SCOPE: CPT

## 2020-07-21 PROCEDURE — 82570 ASSAY OF URINE CREATININE: CPT

## 2020-07-21 PROCEDURE — 82150 ASSAY OF AMYLASE: CPT

## 2020-07-21 PROCEDURE — 83690 ASSAY OF LIPASE: CPT

## 2020-07-23 LAB — TACROLIMUS SERPL-MCNC: 9.8 NG/ML (ref 2–20)

## 2020-08-06 ENCOUNTER — HOSPITAL ENCOUNTER (OUTPATIENT)
Dept: HOSPITAL 62 - OD | Age: 50
End: 2020-08-06
Attending: INTERNAL MEDICINE
Payer: MEDICARE

## 2020-08-06 DIAGNOSIS — B25.9: Primary | ICD-10-CM

## 2020-08-06 DIAGNOSIS — Z79.899: ICD-10-CM

## 2020-08-06 DIAGNOSIS — Z51.81: ICD-10-CM

## 2020-08-06 DIAGNOSIS — Z94.0: ICD-10-CM

## 2020-08-06 LAB
ADD MANUAL DIFF: NO
AMYLASE SERPL-CCNC: 50 U/L (ref 30–110)
ANION GAP SERPL CALC-SCNC: 6 MMOL/L (ref 5–19)
APPEARANCE UR: CLEAR
APTT PPP: YELLOW S
BASOPHILS # BLD AUTO: 0 10^3/UL (ref 0–0.2)
BASOPHILS NFR BLD AUTO: 0.5 % (ref 0–2)
BILIRUB UR QL STRIP: NEGATIVE
BUN SERPL-MCNC: 18 MG/DL (ref 7–20)
CALCIUM: 9.4 MG/DL (ref 8.4–10.2)
CHLORIDE SERPL-SCNC: 102 MMOL/L (ref 98–107)
CO2 SERPL-SCNC: 30 MMOL/L (ref 22–30)
CREAT UR-MCNC: 222.8 MG/DL (ref 15–278)
EOSINOPHIL # BLD AUTO: 0 10^3/UL (ref 0–0.6)
EOSINOPHIL NFR BLD AUTO: 1.3 % (ref 0–6)
ERYTHROCYTE [DISTWIDTH] IN BLOOD BY AUTOMATED COUNT: 13.7 % (ref 11.5–14)
GLUCOSE SERPL-MCNC: 134 MG/DL (ref 75–110)
GLUCOSE UR STRIP-MCNC: NEGATIVE MG/DL
HCT VFR BLD CALC: 38.8 % (ref 36–47)
HGB BLD-MCNC: 12.5 G/DL (ref 12–15.5)
KETONES UR STRIP-MCNC: NEGATIVE MG/DL
LYMPHOCYTES # BLD AUTO: 1.3 10^3/UL (ref 0.5–4.7)
LYMPHOCYTES NFR BLD AUTO: 34.6 % (ref 13–45)
MCH RBC QN AUTO: 28 PG (ref 27–33.4)
MCHC RBC AUTO-ENTMCNC: 32.1 G/DL (ref 32–36)
MCV RBC AUTO: 87 FL (ref 80–97)
MONOCYTES # BLD AUTO: 0.4 10^3/UL (ref 0.1–1.4)
MONOCYTES NFR BLD AUTO: 10.6 % (ref 3–13)
NEUTROPHILS # BLD AUTO: 2 10^3/UL (ref 1.7–8.2)
NEUTS SEG NFR BLD AUTO: 53 % (ref 42–78)
NITRITE UR QL STRIP: NEGATIVE
PH UR STRIP: 6 [PH] (ref 5–9)
PLATELET # BLD: 213 10^3/UL (ref 150–450)
POTASSIUM SERPL-SCNC: 4.5 MMOL/L (ref 3.6–5)
PROT UR STRIP-MCNC: 7.3 MG/DL (ref ?–12)
PROT UR STRIP-MCNC: NEGATIVE MG/DL
RBC # BLD AUTO: 4.44 10^6/UL (ref 3.72–5.28)
SP GR UR STRIP: 1.03
TOTAL CELLS COUNTED % (AUTO): 100 %
UR PRO/CREAT RATIO RESULT: 0 MG/MG (ref 0–0.2)
UROBILINOGEN UR-MCNC: 2 MG/DL (ref ?–2)
WBC # BLD AUTO: 3.8 10^3/UL (ref 4–10.5)

## 2020-08-06 PROCEDURE — 85025 COMPLETE CBC W/AUTO DIFF WBC: CPT

## 2020-08-06 PROCEDURE — 80197 ASSAY OF TACROLIMUS: CPT

## 2020-08-06 PROCEDURE — 81001 URINALYSIS AUTO W/SCOPE: CPT

## 2020-08-06 PROCEDURE — 87496 CYTOMEG DNA AMP PROBE: CPT

## 2020-08-06 PROCEDURE — 82570 ASSAY OF URINE CREATININE: CPT

## 2020-08-06 PROCEDURE — 87799 DETECT AGENT NOS DNA QUANT: CPT

## 2020-08-06 PROCEDURE — 82150 ASSAY OF AMYLASE: CPT

## 2020-08-06 PROCEDURE — 83690 ASSAY OF LIPASE: CPT

## 2020-08-06 PROCEDURE — 36415 COLL VENOUS BLD VENIPUNCTURE: CPT

## 2020-08-06 PROCEDURE — 80048 BASIC METABOLIC PNL TOTAL CA: CPT

## 2020-08-06 PROCEDURE — 84156 ASSAY OF PROTEIN URINE: CPT

## 2020-09-22 ENCOUNTER — HOSPITAL ENCOUNTER (OUTPATIENT)
Dept: HOSPITAL 62 - WI | Age: 50
End: 2020-09-22
Attending: OBSTETRICS & GYNECOLOGY
Payer: MEDICARE

## 2020-09-22 DIAGNOSIS — Z12.31: Primary | ICD-10-CM

## 2020-09-22 PROCEDURE — 77067 SCR MAMMO BI INCL CAD: CPT

## 2020-09-22 PROCEDURE — 77063 BREAST TOMOSYNTHESIS BI: CPT

## 2020-09-22 NOTE — WOMENS IMAGING REPORT
EXAM DESCRIPTION:  3D SCREENING MAMMO BILAT



IMAGES COMPLETED DATE/TIME:  9/22/2020 10:11 am



REASON FOR STUDY:  Z12.31 ENCOUNTER FOR SCREENING MAMMOGRAM FOR MALIGNANT NEOPLASM OF BREAST Z12.31  
ENCNTR SCREEN MAMMOGRAM FOR MALIGNANT NEOPLASM OF CAROLYNN



COMPARISON:  Multiple since 2012



EXAM PARAMETERS:  Views: Standard craniocaudal and mediolateral oblique views of each breast recorded
 using digital acquisition and breast tomosynthesis.

Read with the assistance of CAD.

.[facility]  Version 9.2



LIMITATIONS:  None.



FINDINGS:  No suspicious masses, suspicious calcifications or architectural distortion. No areas of c
oncern.



IMPRESSION:   NEGATIVE MAMMOGRAM. BIRADS 1.



BREAST DENSITY:  b. There are scattered areas of fibroglandular density.



BIRAD:  ASSESSMENT:  1 NEGATIVE



RECOMMENDATION:  ROUTINE SCREENING

Please continue yearly bilateral screening mammography/tomosynthesis in September 2021



COMMENT:  The patient has been notified of the results by letter per MQSA requirements. Additional no
tification policies are in place for contacting patient with suspicious or incomplete findings.

Quality ID #225: The American College of Radiology recommends an annual screening mammogram for women
 aged 40 years or over. This facility utilizes a reminder system to ensure that all patients receive 
reminder letters, and/or direct phone calls for appointments. This includes reminders for routine scr
eening mammograms, diagnostic mammograms, or other Breast Imaging Interventions when appropriate.  Th
is patient will be placed in the appropriate reminder system.



TECHNICAL DOCUMENTATION:  FINDING NUMBER: (1)

ASSESSMENT:  (1)

JOB ID:  [id number]

 2011 [employer]- All Rights Reserved



Reading location - IP/workstation name: HERMAN

## 2020-09-28 ENCOUNTER — HOSPITAL ENCOUNTER (OUTPATIENT)
Dept: HOSPITAL 62 - OD | Age: 50
End: 2020-09-28
Attending: INTERNAL MEDICINE
Payer: MEDICARE

## 2020-09-28 DIAGNOSIS — B25.9: ICD-10-CM

## 2020-09-28 DIAGNOSIS — Z79.899: ICD-10-CM

## 2020-09-28 DIAGNOSIS — Z94.0: Primary | ICD-10-CM

## 2020-09-28 DIAGNOSIS — Z51.81: ICD-10-CM

## 2020-09-28 LAB
ADD MANUAL DIFF: NO
AMYLASE SERPL-CCNC: 323 U/L (ref 30–110)
ANION GAP SERPL CALC-SCNC: 8 MMOL/L (ref 5–19)
APPEARANCE UR: CLEAR
APTT PPP: YELLOW S
BASOPHILS # BLD AUTO: 0 10^3/UL (ref 0–0.2)
BASOPHILS NFR BLD AUTO: 0.3 % (ref 0–2)
BILIRUB UR QL STRIP: NEGATIVE
BUN SERPL-MCNC: 28 MG/DL (ref 7–20)
CALCIUM: 10.3 MG/DL (ref 8.4–10.2)
CHLORIDE SERPL-SCNC: 100 MMOL/L (ref 98–107)
CO2 SERPL-SCNC: 31 MMOL/L (ref 22–30)
CREAT UR-MCNC: 160 MG/DL (ref 15–278)
EOSINOPHIL # BLD AUTO: 0.1 10^3/UL (ref 0–0.6)
EOSINOPHIL NFR BLD AUTO: 1.3 % (ref 0–6)
ERYTHROCYTE [DISTWIDTH] IN BLOOD BY AUTOMATED COUNT: 13.3 % (ref 11.5–14)
GLUCOSE SERPL-MCNC: 159 MG/DL (ref 75–110)
GLUCOSE UR STRIP-MCNC: NEGATIVE MG/DL
HCT VFR BLD CALC: 41.2 % (ref 36–47)
HGB BLD-MCNC: 13.6 G/DL (ref 12–15.5)
KETONES UR STRIP-MCNC: NEGATIVE MG/DL
LYMPHOCYTES # BLD AUTO: 1.2 10^3/UL (ref 0.5–4.7)
LYMPHOCYTES NFR BLD AUTO: 31.3 % (ref 13–45)
MCH RBC QN AUTO: 28.4 PG (ref 27–33.4)
MCHC RBC AUTO-ENTMCNC: 32.9 G/DL (ref 32–36)
MCV RBC AUTO: 86 FL (ref 80–97)
MONOCYTES # BLD AUTO: 0.3 10^3/UL (ref 0.1–1.4)
MONOCYTES NFR BLD AUTO: 8.5 % (ref 3–13)
NEUTROPHILS # BLD AUTO: 2.3 10^3/UL (ref 1.7–8.2)
NEUTS SEG NFR BLD AUTO: 58.6 % (ref 42–78)
NITRITE UR QL STRIP: NEGATIVE
PH UR STRIP: 5 [PH] (ref 5–9)
PLATELET # BLD: 199 10^3/UL (ref 150–450)
POTASSIUM SERPL-SCNC: 4.5 MMOL/L (ref 3.6–5)
PROT UR STRIP-MCNC: 8.2 MG/DL (ref ?–12)
PROT UR STRIP-MCNC: NEGATIVE MG/DL
RBC # BLD AUTO: 4.77 10^6/UL (ref 3.72–5.28)
SP GR UR STRIP: 1.02
TOTAL CELLS COUNTED % (AUTO): 100 %
UR PRO/CREAT RATIO RESULT: 0.1 MG/MG (ref 0–0.2)
UROBILINOGEN UR-MCNC: NEGATIVE MG/DL (ref ?–2)
WBC # BLD AUTO: 4 10^3/UL (ref 4–10.5)

## 2020-09-28 PROCEDURE — 81001 URINALYSIS AUTO W/SCOPE: CPT

## 2020-09-28 PROCEDURE — 87496 CYTOMEG DNA AMP PROBE: CPT

## 2020-09-28 PROCEDURE — 36415 COLL VENOUS BLD VENIPUNCTURE: CPT

## 2020-09-28 PROCEDURE — 85025 COMPLETE CBC W/AUTO DIFF WBC: CPT

## 2020-09-28 PROCEDURE — 83690 ASSAY OF LIPASE: CPT

## 2020-09-28 PROCEDURE — 82570 ASSAY OF URINE CREATININE: CPT

## 2020-09-28 PROCEDURE — 80048 BASIC METABOLIC PNL TOTAL CA: CPT

## 2020-09-28 PROCEDURE — 82150 ASSAY OF AMYLASE: CPT

## 2020-09-28 PROCEDURE — 87799 DETECT AGENT NOS DNA QUANT: CPT

## 2020-09-28 PROCEDURE — 80197 ASSAY OF TACROLIMUS: CPT

## 2020-09-28 PROCEDURE — 84156 ASSAY OF PROTEIN URINE: CPT

## 2020-10-07 ENCOUNTER — HOSPITAL ENCOUNTER (OUTPATIENT)
Dept: HOSPITAL 62 - OD | Age: 50
End: 2020-10-07
Payer: MEDICARE

## 2020-10-07 DIAGNOSIS — Z11.59: ICD-10-CM

## 2020-10-07 DIAGNOSIS — Z51.81: ICD-10-CM

## 2020-10-07 DIAGNOSIS — Z20.828: ICD-10-CM

## 2020-10-07 DIAGNOSIS — Z11.4: ICD-10-CM

## 2020-10-07 DIAGNOSIS — Z94.0: ICD-10-CM

## 2020-10-07 DIAGNOSIS — Z94.83: ICD-10-CM

## 2020-10-07 DIAGNOSIS — Z20.6: ICD-10-CM

## 2020-10-07 DIAGNOSIS — Z79.899: ICD-10-CM

## 2020-10-07 DIAGNOSIS — B25.9: Primary | ICD-10-CM

## 2020-10-07 LAB
ADD MANUAL DIFF: NO
AMYLASE SERPL-CCNC: 83 U/L (ref 30–110)
ANION GAP SERPL CALC-SCNC: 8 MMOL/L (ref 5–19)
APPEARANCE UR: CLEAR
APTT PPP: YELLOW S
BASOPHILS # BLD AUTO: 0 10^3/UL (ref 0–0.2)
BASOPHILS NFR BLD AUTO: 0.1 % (ref 0–2)
BILIRUB UR QL STRIP: NEGATIVE
BUN SERPL-MCNC: 29 MG/DL (ref 7–20)
CALCIUM: 9.2 MG/DL (ref 8.4–10.2)
CHLORIDE SERPL-SCNC: 100 MMOL/L (ref 98–107)
CO2 SERPL-SCNC: 31 MMOL/L (ref 22–30)
CREAT UR-MCNC: 262.8 MG/DL (ref 15–278)
EOSINOPHIL # BLD AUTO: 0 10^3/UL (ref 0–0.6)
EOSINOPHIL NFR BLD AUTO: 0.8 % (ref 0–6)
ERYTHROCYTE [DISTWIDTH] IN BLOOD BY AUTOMATED COUNT: 13.4 % (ref 11.5–14)
GLUCOSE SERPL-MCNC: 174 MG/DL (ref 75–110)
GLUCOSE UR STRIP-MCNC: NEGATIVE MG/DL
HCT VFR BLD CALC: 37.8 % (ref 36–47)
HGB BLD-MCNC: 12.5 G/DL (ref 12–15.5)
KETONES UR STRIP-MCNC: NEGATIVE MG/DL
LYMPHOCYTES # BLD AUTO: 1.6 10^3/UL (ref 0.5–4.7)
LYMPHOCYTES NFR BLD AUTO: 34.4 % (ref 13–45)
MCH RBC QN AUTO: 28.2 PG (ref 27–33.4)
MCHC RBC AUTO-ENTMCNC: 33 G/DL (ref 32–36)
MCV RBC AUTO: 85 FL (ref 80–97)
MONOCYTES # BLD AUTO: 0.6 10^3/UL (ref 0.1–1.4)
MONOCYTES NFR BLD AUTO: 13.4 % (ref 3–13)
NEUTROPHILS # BLD AUTO: 2.4 10^3/UL (ref 1.7–8.2)
NEUTS SEG NFR BLD AUTO: 51.3 % (ref 42–78)
NITRITE UR QL STRIP: NEGATIVE
PH UR STRIP: 5 [PH] (ref 5–9)
PLATELET # BLD: 181 10^3/UL (ref 150–450)
POTASSIUM SERPL-SCNC: 4.4 MMOL/L (ref 3.6–5)
PROT UR STRIP-MCNC: 30 MG/DL
PROT UR STRIP-MCNC: 9.2 MG/DL (ref ?–12)
RBC # BLD AUTO: 4.43 10^6/UL (ref 3.72–5.28)
SP GR UR STRIP: 1.03
TOTAL CELLS COUNTED % (AUTO): 100 %
UR PRO/CREAT RATIO RESULT: 0 MG/MG (ref 0–0.2)
UROBILINOGEN UR-MCNC: 2 MG/DL (ref ?–2)
WBC # BLD AUTO: 4.8 10^3/UL (ref 4–10.5)

## 2020-10-07 PROCEDURE — 82570 ASSAY OF URINE CREATININE: CPT

## 2020-10-07 PROCEDURE — 83690 ASSAY OF LIPASE: CPT

## 2020-10-07 PROCEDURE — 85025 COMPLETE CBC W/AUTO DIFF WBC: CPT

## 2020-10-07 PROCEDURE — 84156 ASSAY OF PROTEIN URINE: CPT

## 2020-10-07 PROCEDURE — 81001 URINALYSIS AUTO W/SCOPE: CPT

## 2020-10-07 PROCEDURE — 80048 BASIC METABOLIC PNL TOTAL CA: CPT

## 2020-10-07 PROCEDURE — 87799 DETECT AGENT NOS DNA QUANT: CPT

## 2020-10-07 PROCEDURE — 36415 COLL VENOUS BLD VENIPUNCTURE: CPT

## 2020-10-07 PROCEDURE — 80197 ASSAY OF TACROLIMUS: CPT

## 2020-10-07 PROCEDURE — 82150 ASSAY OF AMYLASE: CPT

## 2020-10-07 PROCEDURE — 87496 CYTOMEG DNA AMP PROBE: CPT

## 2020-10-14 ENCOUNTER — HOSPITAL ENCOUNTER (OUTPATIENT)
Dept: HOSPITAL 62 - OD | Age: 50
End: 2020-10-14
Attending: STUDENT IN AN ORGANIZED HEALTH CARE EDUCATION/TRAINING PROGRAM
Payer: MEDICARE

## 2020-10-14 DIAGNOSIS — Z94.0: ICD-10-CM

## 2020-10-14 DIAGNOSIS — B25.9: ICD-10-CM

## 2020-10-14 DIAGNOSIS — Z79.899: ICD-10-CM

## 2020-10-14 DIAGNOSIS — Z51.81: Primary | ICD-10-CM

## 2020-10-14 LAB
ADD MANUAL DIFF: NO
AMYLASE SERPL-CCNC: 79 U/L (ref 30–110)
ANION GAP SERPL CALC-SCNC: 9 MMOL/L (ref 5–19)
APPEARANCE UR: CLEAR
APTT PPP: YELLOW S
BASOPHILS # BLD AUTO: 0 10^3/UL (ref 0–0.2)
BASOPHILS NFR BLD AUTO: 0.1 % (ref 0–2)
BILIRUB UR QL STRIP: NEGATIVE
BUN SERPL-MCNC: 25 MG/DL (ref 7–20)
CALCIUM: 10 MG/DL (ref 8.4–10.2)
CHLORIDE SERPL-SCNC: 99 MMOL/L (ref 98–107)
CO2 SERPL-SCNC: 30 MMOL/L (ref 22–30)
CREAT UR-MCNC: 157.8 MG/DL (ref 15–278)
EOSINOPHIL # BLD AUTO: 0 10^3/UL (ref 0–0.6)
EOSINOPHIL NFR BLD AUTO: 0.6 % (ref 0–6)
ERYTHROCYTE [DISTWIDTH] IN BLOOD BY AUTOMATED COUNT: 13.8 % (ref 11.5–14)
GLUCOSE SERPL-MCNC: 137 MG/DL (ref 75–110)
GLUCOSE UR STRIP-MCNC: NEGATIVE MG/DL
HCT VFR BLD CALC: 40.1 % (ref 36–47)
HGB BLD-MCNC: 12.9 G/DL (ref 12–15.5)
KETONES UR STRIP-MCNC: NEGATIVE MG/DL
LYMPHOCYTES # BLD AUTO: 1.9 10^3/UL (ref 0.5–4.7)
LYMPHOCYTES NFR BLD AUTO: 32.4 % (ref 13–45)
MCH RBC QN AUTO: 27.9 PG (ref 27–33.4)
MCHC RBC AUTO-ENTMCNC: 32.3 G/DL (ref 32–36)
MCV RBC AUTO: 86 FL (ref 80–97)
MONOCYTES # BLD AUTO: 0.5 10^3/UL (ref 0.1–1.4)
MONOCYTES NFR BLD AUTO: 7.8 % (ref 3–13)
NEUTROPHILS # BLD AUTO: 3.5 10^3/UL (ref 1.7–8.2)
NEUTS SEG NFR BLD AUTO: 59.1 % (ref 42–78)
NITRITE UR QL STRIP: NEGATIVE
PH UR STRIP: 6 [PH] (ref 5–9)
PLATELET # BLD: 159 10^3/UL (ref 150–450)
POTASSIUM SERPL-SCNC: 4.3 MMOL/L (ref 3.6–5)
PROT UR STRIP-MCNC: 9.4 MG/DL (ref ?–12)
PROT UR STRIP-MCNC: NEGATIVE MG/DL
RBC # BLD AUTO: 4.63 10^6/UL (ref 3.72–5.28)
SP GR UR STRIP: 1.02
TOTAL CELLS COUNTED % (AUTO): 100 %
UR PRO/CREAT RATIO RESULT: 0.1 MG/MG (ref 0–0.2)
UROBILINOGEN UR-MCNC: NEGATIVE MG/DL (ref ?–2)
WBC # BLD AUTO: 5.9 10^3/UL (ref 4–10.5)

## 2020-10-14 PROCEDURE — 80197 ASSAY OF TACROLIMUS: CPT

## 2020-10-14 PROCEDURE — 82570 ASSAY OF URINE CREATININE: CPT

## 2020-10-14 PROCEDURE — 87799 DETECT AGENT NOS DNA QUANT: CPT

## 2020-10-14 PROCEDURE — 80048 BASIC METABOLIC PNL TOTAL CA: CPT

## 2020-10-14 PROCEDURE — 83690 ASSAY OF LIPASE: CPT

## 2020-10-14 PROCEDURE — 82150 ASSAY OF AMYLASE: CPT

## 2020-10-14 PROCEDURE — 87496 CYTOMEG DNA AMP PROBE: CPT

## 2020-10-14 PROCEDURE — 36415 COLL VENOUS BLD VENIPUNCTURE: CPT

## 2020-10-14 PROCEDURE — 81001 URINALYSIS AUTO W/SCOPE: CPT

## 2020-10-14 PROCEDURE — 85025 COMPLETE CBC W/AUTO DIFF WBC: CPT

## 2020-10-14 PROCEDURE — 84156 ASSAY OF PROTEIN URINE: CPT

## 2020-10-21 ENCOUNTER — HOSPITAL ENCOUNTER (OUTPATIENT)
Dept: HOSPITAL 62 - OD | Age: 50
End: 2020-10-21
Attending: STUDENT IN AN ORGANIZED HEALTH CARE EDUCATION/TRAINING PROGRAM
Payer: MEDICARE

## 2020-10-21 DIAGNOSIS — Z11.4: ICD-10-CM

## 2020-10-21 DIAGNOSIS — Z94.0: ICD-10-CM

## 2020-10-21 DIAGNOSIS — N39.0: ICD-10-CM

## 2020-10-21 DIAGNOSIS — Z51.81: ICD-10-CM

## 2020-10-21 DIAGNOSIS — B25.9: Primary | ICD-10-CM

## 2020-10-21 DIAGNOSIS — Z11.59: ICD-10-CM

## 2020-10-21 DIAGNOSIS — Z20.828: ICD-10-CM

## 2020-10-21 DIAGNOSIS — Z20.6: ICD-10-CM

## 2020-10-21 DIAGNOSIS — Z79.899: ICD-10-CM

## 2020-10-21 LAB
ADD MANUAL DIFF: NO
AMYLASE SERPL-CCNC: 56 U/L (ref 30–110)
ANION GAP SERPL CALC-SCNC: 8 MMOL/L (ref 5–19)
APPEARANCE UR: CLEAR
APTT PPP: YELLOW S
BASOPHILS # BLD AUTO: 0 10^3/UL (ref 0–0.2)
BASOPHILS NFR BLD AUTO: 0.2 % (ref 0–2)
BILIRUB UR QL STRIP: NEGATIVE
BUN SERPL-MCNC: 26 MG/DL (ref 7–20)
CALCIUM: 9.1 MG/DL (ref 8.4–10.2)
CHLORIDE SERPL-SCNC: 101 MMOL/L (ref 98–107)
CO2 SERPL-SCNC: 28 MMOL/L (ref 22–30)
CREAT UR-MCNC: 222.6 MG/DL (ref 15–278)
EOSINOPHIL # BLD AUTO: 0 10^3/UL (ref 0–0.6)
EOSINOPHIL NFR BLD AUTO: 0.6 % (ref 0–6)
ERYTHROCYTE [DISTWIDTH] IN BLOOD BY AUTOMATED COUNT: 14.1 % (ref 11.5–14)
GLUCOSE SERPL-MCNC: 156 MG/DL (ref 75–110)
GLUCOSE UR STRIP-MCNC: NEGATIVE MG/DL
HCT VFR BLD CALC: 37.5 % (ref 36–47)
HGB BLD-MCNC: 12.2 G/DL (ref 12–15.5)
KETONES UR STRIP-MCNC: NEGATIVE MG/DL
LYMPHOCYTES # BLD AUTO: 2 10^3/UL (ref 0.5–4.7)
LYMPHOCYTES NFR BLD AUTO: 44.8 % (ref 13–45)
MCH RBC QN AUTO: 28 PG (ref 27–33.4)
MCHC RBC AUTO-ENTMCNC: 32.5 G/DL (ref 32–36)
MCV RBC AUTO: 86 FL (ref 80–97)
MONOCYTES # BLD AUTO: 0.4 10^3/UL (ref 0.1–1.4)
MONOCYTES NFR BLD AUTO: 8 % (ref 3–13)
NEUTROPHILS # BLD AUTO: 2.1 10^3/UL (ref 1.7–8.2)
NEUTS SEG NFR BLD AUTO: 46.4 % (ref 42–78)
NITRITE UR QL STRIP: NEGATIVE
PH UR STRIP: 5 [PH] (ref 5–9)
PLATELET # BLD: 150 10^3/UL (ref 150–450)
POTASSIUM SERPL-SCNC: 4.2 MMOL/L (ref 3.6–5)
PROT UR STRIP-MCNC: 7.3 MG/DL (ref ?–12)
PROT UR STRIP-MCNC: NEGATIVE MG/DL
RBC # BLD AUTO: 4.34 10^6/UL (ref 3.72–5.28)
SP GR UR STRIP: 1.02
TOTAL CELLS COUNTED % (AUTO): 100 %
UR PRO/CREAT RATIO RESULT: 0 MG/MG (ref 0–0.2)
UROBILINOGEN UR-MCNC: NEGATIVE MG/DL (ref ?–2)
WBC # BLD AUTO: 4.5 10^3/UL (ref 4–10.5)

## 2020-10-21 PROCEDURE — 82570 ASSAY OF URINE CREATININE: CPT

## 2020-10-21 PROCEDURE — 81001 URINALYSIS AUTO W/SCOPE: CPT

## 2020-10-21 PROCEDURE — 36415 COLL VENOUS BLD VENIPUNCTURE: CPT

## 2020-10-21 PROCEDURE — 80197 ASSAY OF TACROLIMUS: CPT

## 2020-10-21 PROCEDURE — 87799 DETECT AGENT NOS DNA QUANT: CPT

## 2020-10-21 PROCEDURE — 83690 ASSAY OF LIPASE: CPT

## 2020-10-21 PROCEDURE — 87496 CYTOMEG DNA AMP PROBE: CPT

## 2020-10-21 PROCEDURE — 85025 COMPLETE CBC W/AUTO DIFF WBC: CPT

## 2020-10-21 PROCEDURE — 80048 BASIC METABOLIC PNL TOTAL CA: CPT

## 2020-10-21 PROCEDURE — 82150 ASSAY OF AMYLASE: CPT

## 2020-10-21 PROCEDURE — 84156 ASSAY OF PROTEIN URINE: CPT

## 2020-10-28 ENCOUNTER — HOSPITAL ENCOUNTER (OUTPATIENT)
Dept: HOSPITAL 62 - OD | Age: 50
End: 2020-10-28
Attending: STUDENT IN AN ORGANIZED HEALTH CARE EDUCATION/TRAINING PROGRAM
Payer: MEDICARE

## 2020-10-28 DIAGNOSIS — B25.9: ICD-10-CM

## 2020-10-28 DIAGNOSIS — Z79.899: ICD-10-CM

## 2020-10-28 DIAGNOSIS — Z94.0: ICD-10-CM

## 2020-10-28 DIAGNOSIS — Z51.81: Primary | ICD-10-CM

## 2020-10-28 LAB
ADD MANUAL DIFF: NO
AMYLASE SERPL-CCNC: 61 U/L (ref 30–110)
ANION GAP SERPL CALC-SCNC: 6 MMOL/L (ref 5–19)
APPEARANCE UR: CLEAR
APTT PPP: YELLOW S
BASOPHILS # BLD AUTO: 0 10^3/UL (ref 0–0.2)
BASOPHILS NFR BLD AUTO: 0.3 % (ref 0–2)
BILIRUB UR QL STRIP: NEGATIVE
BUN SERPL-MCNC: 24 MG/DL (ref 7–20)
CALCIUM: 9.3 MG/DL (ref 8.4–10.2)
CHLORIDE SERPL-SCNC: 104 MMOL/L (ref 98–107)
CO2 SERPL-SCNC: 27 MMOL/L (ref 22–30)
CREAT UR-MCNC: 204.4 MG/DL (ref 15–278)
EOSINOPHIL # BLD AUTO: 0 10^3/UL (ref 0–0.6)
EOSINOPHIL NFR BLD AUTO: 0.7 % (ref 0–6)
ERYTHROCYTE [DISTWIDTH] IN BLOOD BY AUTOMATED COUNT: 14.3 % (ref 11.5–14)
GLUCOSE SERPL-MCNC: 156 MG/DL (ref 75–110)
GLUCOSE UR STRIP-MCNC: NEGATIVE MG/DL
HCT VFR BLD CALC: 38.6 % (ref 36–47)
HGB BLD-MCNC: 12.4 G/DL (ref 12–15.5)
KETONES UR STRIP-MCNC: NEGATIVE MG/DL
LYMPHOCYTES # BLD AUTO: 1.9 10^3/UL (ref 0.5–4.7)
LYMPHOCYTES NFR BLD AUTO: 42.3 % (ref 13–45)
MCH RBC QN AUTO: 28.2 PG (ref 27–33.4)
MCHC RBC AUTO-ENTMCNC: 32.2 G/DL (ref 32–36)
MCV RBC AUTO: 88 FL (ref 80–97)
MONOCYTES # BLD AUTO: 0.4 10^3/UL (ref 0.1–1.4)
MONOCYTES NFR BLD AUTO: 8.9 % (ref 3–13)
NEUTROPHILS # BLD AUTO: 2.1 10^3/UL (ref 1.7–8.2)
NEUTS SEG NFR BLD AUTO: 47.8 % (ref 42–78)
NITRITE UR QL STRIP: NEGATIVE
PH UR STRIP: 6 [PH] (ref 5–9)
PLATELET # BLD: 161 10^3/UL (ref 150–450)
POTASSIUM SERPL-SCNC: 4.5 MMOL/L (ref 3.6–5)
PROT UR STRIP-MCNC: 7.6 MG/DL (ref ?–12)
PROT UR STRIP-MCNC: NEGATIVE MG/DL
RBC # BLD AUTO: 4.41 10^6/UL (ref 3.72–5.28)
SP GR UR STRIP: 1.02
TOTAL CELLS COUNTED % (AUTO): 100 %
UR PRO/CREAT RATIO RESULT: 0 MG/MG (ref 0–0.2)
UROBILINOGEN UR-MCNC: NEGATIVE MG/DL (ref ?–2)
WBC # BLD AUTO: 4.4 10^3/UL (ref 4–10.5)

## 2020-10-28 PROCEDURE — 84156 ASSAY OF PROTEIN URINE: CPT

## 2020-10-28 PROCEDURE — 82150 ASSAY OF AMYLASE: CPT

## 2020-10-28 PROCEDURE — 82570 ASSAY OF URINE CREATININE: CPT

## 2020-10-28 PROCEDURE — 36415 COLL VENOUS BLD VENIPUNCTURE: CPT

## 2020-10-28 PROCEDURE — 85025 COMPLETE CBC W/AUTO DIFF WBC: CPT

## 2020-10-28 PROCEDURE — 80048 BASIC METABOLIC PNL TOTAL CA: CPT

## 2020-10-28 PROCEDURE — 81001 URINALYSIS AUTO W/SCOPE: CPT

## 2020-10-28 PROCEDURE — 83690 ASSAY OF LIPASE: CPT

## 2020-10-28 PROCEDURE — 87496 CYTOMEG DNA AMP PROBE: CPT

## 2020-10-28 PROCEDURE — 87799 DETECT AGENT NOS DNA QUANT: CPT

## 2020-10-28 PROCEDURE — 80197 ASSAY OF TACROLIMUS: CPT

## 2020-12-16 ENCOUNTER — HOSPITAL ENCOUNTER (OUTPATIENT)
Dept: HOSPITAL 62 - OD | Age: 50
End: 2020-12-16
Attending: STUDENT IN AN ORGANIZED HEALTH CARE EDUCATION/TRAINING PROGRAM
Payer: MEDICARE

## 2020-12-16 DIAGNOSIS — Z79.899: ICD-10-CM

## 2020-12-16 DIAGNOSIS — Z94.0: ICD-10-CM

## 2020-12-16 DIAGNOSIS — B25.9: Primary | ICD-10-CM

## 2020-12-16 DIAGNOSIS — I10: ICD-10-CM

## 2020-12-16 DIAGNOSIS — Z51.81: ICD-10-CM

## 2020-12-16 DIAGNOSIS — N39.0: ICD-10-CM

## 2020-12-16 LAB
ADD MANUAL DIFF: NO
AMYLASE SERPL-CCNC: 47 U/L (ref 30–110)
ANION GAP SERPL CALC-SCNC: 4 MMOL/L (ref 5–19)
APPEARANCE UR: CLEAR
APTT PPP: YELLOW S
BASOPHILS # BLD AUTO: 0 10^3/UL (ref 0–0.2)
BASOPHILS NFR BLD AUTO: 0.3 % (ref 0–2)
BILIRUB UR QL STRIP: NEGATIVE
BUN SERPL-MCNC: 24 MG/DL (ref 7–20)
CALCIUM: 9.8 MG/DL (ref 8.4–10.2)
CHLORIDE SERPL-SCNC: 100 MMOL/L (ref 98–107)
CO2 SERPL-SCNC: 32 MMOL/L (ref 22–30)
CREAT UR-MCNC: 169.6 MG/DL (ref 15–278)
EOSINOPHIL # BLD AUTO: 0 10^3/UL (ref 0–0.6)
EOSINOPHIL NFR BLD AUTO: 1 % (ref 0–6)
ERYTHROCYTE [DISTWIDTH] IN BLOOD BY AUTOMATED COUNT: 14 % (ref 11.5–14)
GLUCOSE SERPL-MCNC: 171 MG/DL (ref 75–110)
GLUCOSE UR STRIP-MCNC: NEGATIVE MG/DL
HCT VFR BLD CALC: 39.8 % (ref 36–47)
HGB BLD-MCNC: 12.5 G/DL (ref 12–15.5)
KETONES UR STRIP-MCNC: NEGATIVE MG/DL
LYMPHOCYTES # BLD AUTO: 0.9 10^3/UL (ref 0.5–4.7)
LYMPHOCYTES NFR BLD AUTO: 35.9 % (ref 13–45)
MCH RBC QN AUTO: 27.5 PG (ref 27–33.4)
MCHC RBC AUTO-ENTMCNC: 31.5 G/DL (ref 32–36)
MCV RBC AUTO: 87 FL (ref 80–97)
MONOCYTES # BLD AUTO: 0.3 10^3/UL (ref 0.1–1.4)
MONOCYTES NFR BLD AUTO: 13.8 % (ref 3–13)
NEUTROPHILS # BLD AUTO: 1.2 10^3/UL (ref 1.7–8.2)
NEUTS SEG NFR BLD AUTO: 49 % (ref 42–78)
NITRITE UR QL STRIP: NEGATIVE
PH UR STRIP: 6 [PH] (ref 5–9)
PLATELET # BLD: 150 10^3/UL (ref 150–450)
POTASSIUM SERPL-SCNC: 4.8 MMOL/L (ref 3.6–5)
PROT UR STRIP-MCNC: 7.5 MG/DL (ref ?–12)
PROT UR STRIP-MCNC: NEGATIVE MG/DL
RBC # BLD AUTO: 4.55 10^6/UL (ref 3.72–5.28)
SP GR UR STRIP: 1.02
TOTAL CELLS COUNTED % (AUTO): 100 %
UR PRO/CREAT RATIO RESULT: 0 MG/MG (ref 0–0.2)
UROBILINOGEN UR-MCNC: NEGATIVE MG/DL (ref ?–2)
WBC # BLD AUTO: 2.5 10^3/UL (ref 4–10.5)

## 2020-12-16 PROCEDURE — 82570 ASSAY OF URINE CREATININE: CPT

## 2020-12-16 PROCEDURE — 87799 DETECT AGENT NOS DNA QUANT: CPT

## 2020-12-16 PROCEDURE — 82150 ASSAY OF AMYLASE: CPT

## 2020-12-16 PROCEDURE — 87496 CYTOMEG DNA AMP PROBE: CPT

## 2020-12-16 PROCEDURE — 85025 COMPLETE CBC W/AUTO DIFF WBC: CPT

## 2020-12-16 PROCEDURE — 81001 URINALYSIS AUTO W/SCOPE: CPT

## 2020-12-16 PROCEDURE — 83690 ASSAY OF LIPASE: CPT

## 2020-12-16 PROCEDURE — 84156 ASSAY OF PROTEIN URINE: CPT

## 2020-12-16 PROCEDURE — 36415 COLL VENOUS BLD VENIPUNCTURE: CPT

## 2020-12-16 PROCEDURE — 80197 ASSAY OF TACROLIMUS: CPT

## 2020-12-16 PROCEDURE — 80048 BASIC METABOLIC PNL TOTAL CA: CPT

## 2021-01-06 ENCOUNTER — HOSPITAL ENCOUNTER (OUTPATIENT)
Dept: HOSPITAL 62 - OD | Age: 51
End: 2021-01-06
Attending: STUDENT IN AN ORGANIZED HEALTH CARE EDUCATION/TRAINING PROGRAM
Payer: MEDICARE

## 2021-01-06 DIAGNOSIS — Z79.899: ICD-10-CM

## 2021-01-06 DIAGNOSIS — B25.9: ICD-10-CM

## 2021-01-06 DIAGNOSIS — Z51.81: ICD-10-CM

## 2021-01-06 DIAGNOSIS — Z94.0: Primary | ICD-10-CM

## 2021-01-06 LAB
%HYPO/RBC NFR BLD AUTO: SLIGHT %
ABSOLUTE LYMPHOCYTES# (MANUAL): 1 10^3/UL (ref 0.5–4.7)
ABSOLUTE MONOCYTES # (MANUAL): 0.6 10^3/UL (ref 0.1–1.4)
ADD MANUAL DIFF: YES
AMYLASE SERPL-CCNC: 50 U/L (ref 30–110)
ANION GAP SERPL CALC-SCNC: 5 MMOL/L (ref 5–19)
APPEARANCE UR: (no result)
APTT PPP: YELLOW S
BASOPHILS NFR BLD MANUAL: 0 % (ref 0–2)
BILIRUB UR QL STRIP: NEGATIVE
BUN SERPL-MCNC: 20 MG/DL (ref 7–20)
CALCIUM: 9.3 MG/DL (ref 8.4–10.2)
CHLORIDE SERPL-SCNC: 101 MMOL/L (ref 98–107)
CO2 SERPL-SCNC: 32 MMOL/L (ref 22–30)
CREAT UR-MCNC: 356.7 MG/DL (ref 15–278)
EOSINOPHIL NFR BLD MANUAL: 1 % (ref 0–6)
ERYTHROCYTE [DISTWIDTH] IN BLOOD BY AUTOMATED COUNT: 13.8 % (ref 11.5–14)
GLUCOSE SERPL-MCNC: 194 MG/DL (ref 75–110)
GLUCOSE UR STRIP-MCNC: 50 MG/DL
HCT VFR BLD CALC: 36.5 % (ref 36–47)
HGB BLD-MCNC: 12 G/DL (ref 12–15.5)
KETONES UR STRIP-MCNC: NEGATIVE MG/DL
MCH RBC QN AUTO: 28 PG (ref 27–33.4)
MCHC RBC AUTO-ENTMCNC: 32.9 G/DL (ref 32–36)
MCV RBC AUTO: 85 FL (ref 80–97)
MONOCYTES % (MANUAL): 19 % (ref 3–13)
NITRITE UR QL STRIP: NEGATIVE
NRBC BLD AUTO-RTO: 1 /100 WBC
PH UR STRIP: 6 [PH] (ref 5–9)
PLATELET # BLD: 158 10^3/UL (ref 150–450)
PLATELET COMMENT: ADEQUATE
PLATELET LARGE: PRESENT
POLYCHROMASIA BLD QL SMEAR: SLIGHT
POTASSIUM SERPL-SCNC: 4.9 MMOL/L (ref 3.6–5)
PROT UR STRIP-MCNC: 30 MG/DL
PROT UR STRIP-MCNC: 7.8 MG/DL (ref ?–12)
RBC # BLD AUTO: 4.29 10^6/UL (ref 3.72–5.28)
SEGMENTED NEUTROPHILS % (MAN): 44 % (ref 42–78)
SP GR UR STRIP: 1.03
TOTAL CELLS COUNTED BLD: 100
UR PRO/CREAT RATIO RESULT: 0 MG/MG (ref 0–0.2)
UROBILINOGEN UR-MCNC: 2 MG/DL (ref ?–2)
VARIANT LYMPHS NFR BLD MANUAL: 36 % (ref 13–45)
WBC # BLD AUTO: 2.9 10^3/UL (ref 4–10.5)

## 2021-01-06 PROCEDURE — 84156 ASSAY OF PROTEIN URINE: CPT

## 2021-01-06 PROCEDURE — 85025 COMPLETE CBC W/AUTO DIFF WBC: CPT

## 2021-01-06 PROCEDURE — 87799 DETECT AGENT NOS DNA QUANT: CPT

## 2021-01-06 PROCEDURE — 81001 URINALYSIS AUTO W/SCOPE: CPT

## 2021-01-06 PROCEDURE — 83690 ASSAY OF LIPASE: CPT

## 2021-01-06 PROCEDURE — 36415 COLL VENOUS BLD VENIPUNCTURE: CPT

## 2021-01-06 PROCEDURE — 82150 ASSAY OF AMYLASE: CPT

## 2021-01-06 PROCEDURE — 87496 CYTOMEG DNA AMP PROBE: CPT

## 2021-01-06 PROCEDURE — 80048 BASIC METABOLIC PNL TOTAL CA: CPT

## 2021-01-06 PROCEDURE — 80197 ASSAY OF TACROLIMUS: CPT

## 2021-01-06 PROCEDURE — 82570 ASSAY OF URINE CREATININE: CPT

## 2021-01-12 ENCOUNTER — HOSPITAL ENCOUNTER (EMERGENCY)
Dept: HOSPITAL 62 - ER | Age: 51
Discharge: HOME | End: 2021-01-12
Payer: MEDICARE

## 2021-01-12 VITALS — DIASTOLIC BLOOD PRESSURE: 72 MMHG | SYSTOLIC BLOOD PRESSURE: 110 MMHG

## 2021-01-12 DIAGNOSIS — M54.5: ICD-10-CM

## 2021-01-12 DIAGNOSIS — Z79.82: ICD-10-CM

## 2021-01-12 DIAGNOSIS — M79.10: ICD-10-CM

## 2021-01-12 DIAGNOSIS — E10.9: ICD-10-CM

## 2021-01-12 DIAGNOSIS — I10: ICD-10-CM

## 2021-01-12 DIAGNOSIS — M25.551: ICD-10-CM

## 2021-01-12 DIAGNOSIS — Z88.2: ICD-10-CM

## 2021-01-12 DIAGNOSIS — D70.2: Primary | ICD-10-CM

## 2021-01-12 DIAGNOSIS — Z88.8: ICD-10-CM

## 2021-01-12 DIAGNOSIS — Z20.828: ICD-10-CM

## 2021-01-12 DIAGNOSIS — M25.552: ICD-10-CM

## 2021-01-12 DIAGNOSIS — Z79.899: ICD-10-CM

## 2021-01-12 LAB
ADD MANUAL DIFF: NO
ALBUMIN SERPL-MCNC: 4.3 G/DL (ref 3.5–5)
ALP SERPL-CCNC: 56 U/L (ref 38–126)
ANION GAP SERPL CALC-SCNC: 8 MMOL/L (ref 5–19)
APPEARANCE UR: (no result)
APTT PPP: YELLOW S
AST SERPL-CCNC: 46 U/L (ref 14–36)
BASOPHILS # BLD AUTO: 0 10^3/UL (ref 0–0.2)
BASOPHILS NFR BLD AUTO: 0.4 % (ref 0–2)
BILIRUB DIRECT SERPL-MCNC: 0.3 MG/DL (ref 0–0.4)
BILIRUB SERPL-MCNC: 0.7 MG/DL (ref 0.2–1.3)
BILIRUB UR QL STRIP: NEGATIVE
BUN SERPL-MCNC: 18 MG/DL (ref 7–20)
CALCIUM: 9.3 MG/DL (ref 8.4–10.2)
CHLORIDE SERPL-SCNC: 101 MMOL/L (ref 98–107)
CO2 SERPL-SCNC: 27 MMOL/L (ref 22–30)
EOSINOPHIL # BLD AUTO: 0 10^3/UL (ref 0–0.6)
EOSINOPHIL NFR BLD AUTO: 0 % (ref 0–6)
ERYTHROCYTE [DISTWIDTH] IN BLOOD BY AUTOMATED COUNT: 13.2 % (ref 11.5–14)
GLUCOSE SERPL-MCNC: 250 MG/DL (ref 75–110)
GLUCOSE UR STRIP-MCNC: >=500 MG/DL
HCT VFR BLD CALC: 42.3 % (ref 36–47)
HGB BLD-MCNC: 13.5 G/DL (ref 12–15.5)
KETONES UR STRIP-MCNC: NEGATIVE MG/DL
LYMPHOCYTES # BLD AUTO: 0.6 10^3/UL (ref 0.5–4.7)
LYMPHOCYTES NFR BLD AUTO: 23.4 % (ref 13–45)
MCH RBC QN AUTO: 27.3 PG (ref 27–33.4)
MCHC RBC AUTO-ENTMCNC: 32 G/DL (ref 32–36)
MCV RBC AUTO: 85 FL (ref 80–97)
MONOCYTES # BLD AUTO: 0.3 10^3/UL (ref 0.1–1.4)
MONOCYTES NFR BLD AUTO: 10.4 % (ref 3–13)
NEUTROPHILS # BLD AUTO: 1.6 10^3/UL (ref 1.7–8.2)
NEUTS SEG NFR BLD AUTO: 65.8 % (ref 42–78)
NITRITE UR QL STRIP: NEGATIVE
PH UR STRIP: 6 [PH] (ref 5–9)
PLATELET # BLD: 142 10^3/UL (ref 150–450)
POTASSIUM SERPL-SCNC: 4.8 MMOL/L (ref 3.6–5)
PROT SERPL-MCNC: 7.5 G/DL (ref 6.3–8.2)
PROT UR STRIP-MCNC: 30 MG/DL
RBC # BLD AUTO: 4.96 10^6/UL (ref 3.72–5.28)
SP GR UR STRIP: 1.03
TOTAL CELLS COUNTED % (AUTO): 100 %
UROBILINOGEN UR-MCNC: 2 MG/DL (ref ?–2)
WBC # BLD AUTO: 2.4 10^3/UL (ref 4–10.5)

## 2021-01-12 PROCEDURE — 82550 ASSAY OF CK (CPK): CPT

## 2021-01-12 PROCEDURE — 80053 COMPREHEN METABOLIC PANEL: CPT

## 2021-01-12 PROCEDURE — 85025 COMPLETE CBC W/AUTO DIFF WBC: CPT

## 2021-01-12 PROCEDURE — C9803 HOPD COVID-19 SPEC COLLECT: HCPCS

## 2021-01-12 PROCEDURE — 99283 EMERGENCY DEPT VISIT LOW MDM: CPT

## 2021-01-12 PROCEDURE — 81001 URINALYSIS AUTO W/SCOPE: CPT

## 2021-01-12 PROCEDURE — 87635 SARS-COV-2 COVID-19 AMP PRB: CPT

## 2021-01-12 PROCEDURE — 36415 COLL VENOUS BLD VENIPUNCTURE: CPT

## 2021-01-12 PROCEDURE — 83690 ASSAY OF LIPASE: CPT

## 2021-01-12 PROCEDURE — 87086 URINE CULTURE/COLONY COUNT: CPT

## 2021-01-12 NOTE — ER DOCUMENT REPORT
ED Medical Screen (RME)





- General


Chief Complaint: Low Back Pain


Stated Complaint: LOW BACK,BODY PAIN


Time Seen by Provider: 21 20:00


Primary Care Provider: 


KIM MACK MD [Primary Care Provider] - Follow up as needed


TRAVEL OUTSIDE OF THE [place] IN LAST 30 DAYS: No





- HPI


Notes: 





21 20:37


50-year-old female with a history of kidney and pancreatic transplant done 4 

years ago (due to uncontrolled diabetes required dialysis and these transplants 

resolved both of these issues) presents to the emergency department for 

complaints of body pain and lower back pain that started 2 weeks ago.  Patient 

follows with [facility], due to her having recent labs that showed 

neutropenia, they advised her to come to the emergency room.  Patient is 

currently on immunosuppressive medications, the [facility] transplant team did report 

the transplant 2016.  Patient states she has had lower back pain and body pain 

for the last 2 weeks.  She is concerned that her lipase may be elevated and this

is what [facility] advised her to come get checked out in the emergency room 

today.  Patient states she is having body pain which is 3 out of 5, is 

requesting Tylenol.  States she did take Tylenol prior to coming to the 

emergency room.  Denies any nausea, vomiting, diarrhea.  Denies any chest pain 

or shortness of breath.





I have greeted and performed a rapid initial assessment of this patient.  A 

comprehensive ED assessment and evaluation of the patient, analysis of test res

ults and completion of the medical decision making process will be conducted by 

additional ED providers.





PHYSICAL EXAMINATION:





GENERAL: Well-appearing, well-nourished and in no acute distress.





CV: s1, s2 regular 





LUNGS: No respiratory distress





abd: No abdominal pain on palpation throughout, no CVA tenderness appreciated





Musculoskeletal: Normal range of motion





NEUROLOGICAL:  Normal speech, normal gait. 





SKIN: Warm, Dry, normal turgor, no rashes or lesions noted.








The patient was evaluated during a global COVID-19 pandemic and that diagnosis 

was suspected/considered upon their initial presentation.  Their evaluation, 

treatment and testing was consistent with current guidelines for patients who 

present with complaints or symptoms and may be related to COVID-19.











- Related Data


Allergies/Adverse Reactions: 


                                        





propoxyphene napsylate [From Darvocet-N 100] Allergy (Severe, Verified 16 

09:00)


   n and v,dizziness,coughing


tuberculin, purified protein deriva [Tuberculin,Purif.Prot.Deriv.] Allergy 

(Severe, Verified 16 09:00)


   PPD CONVERTER


sulfamethoxazole [From Bactrim] Allergy (Verified 19 18:59)


   


tacrolimus [From Envarsus XR] Allergy (Verified 21 18:15)


   


trimethoprim [From Bactrim] Allergy (Verified 19 18:59)


   


hydrocodone bitartrate [From Vicodin] Adverse Reaction (Unknown, Verified 

16 09:00)


   Nausea


oxycodone HCl [From Percocet] Adverse Reaction (Unknown, Verified 16 

09:00)


   Nausea








Home Medications: atrovastatin.  prednisone.  sulfamethroxazole0-trimethoprim.  

tacrolimus.  mycophenolate.  famotidine.  ondansetron.  lispro.  sliding scale. 

latanoprost.  cholecalciferol.  magnesium oxide.  brimonidine-timolol.  asa





Past Medical History





- Social History


Chew tobacco use (# tins/day): No


Frequency of alcohol use: None


Drug Abuse: None





- Past Medical History


Cardiac Medical History: Reports: Hx Hypertension - on meds


   Denies: Hx Coronary Artery Disease, Hx Heart Attack


Pulmonary Medical History: Reports: Hx Tuberculosis - EXPOSURE AT WORK AND 

TREATED


   Denies: Hx Asthma, Hx Bronchitis, Hx COPD, Hx Pneumonia


Neurological Medical History: Denies: Hx Cerebrovascular Accident, Hx Seizures


Endocrine Medical History: Reports: Hx Diabetes Mellitus Type 1


Renal/ Medical History: Reports: Hx End Stage Renal Disease - had kidney 

transplant, Hx Kidney Stones.  Denies: Hx Peritoneal Dialysis


GI Medical History: 


Musculoskeltal Medical History: Reports Hx Arthritis


Psychiatric Medical History: 


   Denies: Hx Depression


Infectious Medical History: 


Past Surgical History: Reports: Hx Hysterectomy, Hx Kidney (Renal Surgery) - 

kidney transplant, Hx Pancreatic Surgery - pancreas transplant, Hx Tubal 

Ligation, Other - Renal and pancreatic transplant.  Denies: Hx Appendectomy, Hx 

Bowel Surgery, Hx  Section, Hx Cholecystectomy, Hx Mastectomy, Hx Open 

Heart Surgery, Hx Tonsillectomy





- Immunizations


Hx Diphtheria, Pertussis, Tetanus Vaccination: Yes





Physical Exam





- Vital signs


Vitals: 


                                        











Temp Pulse Resp BP Pulse Ox


 


 98.4 F   100   16   171/87 H  96 


 


 21 17:40  21 17:40  21 17:40  21 17:40  21 17:40














Course





- Vital Signs


Vital signs: 


                                        











Temp Pulse Resp BP Pulse Ox


 


 98.4 F   100   16   171/87 H  96 


 


 21 17:40  21 17:40  21 17:40  21 17:40  21 17:40














- Laboratory Results


Result Diagrams: 


                                 21 18:06





                                 21 18:06


Laboratory Results Interpreted: 


                                        











  21





  18:00 18:06 18:06


 


WBC   2.4 L 


 


Plt Count   142 L 


 


Absolute Neuts (auto)   1.6 L 


 


Sodium    136.4 L


 


Glucose    250 H


 


AST    46 H


 


Urine Protein  30 H  


 


Urine Glucose (UA)  >=500 H  


 


Urine Urobilinogen  2.0 H  














Doctor's Discharge





- Discharge


Referrals: 


KIM MACK MD [Primary Care Provider] - Follow up as needed

## 2021-01-12 NOTE — ER DOCUMENT REPORT
ED General





- General


Chief Complaint: Low Back Pain


Stated Complaint: LOW BACK,BODY PAIN


Time Seen by Provider: 21 20:00


Primary Care Provider: 


KIM MACK MD [Primary Care Provider] - Follow up as needed


TRAVEL OUTSIDE OF THE [place] IN LAST 30 DAYS: No





- HPI


Notes: 





50-year-old female presents with body aches ongoing for the past 1 month.  

Patient states that she is having pain in her lower back, bilateral hips, 

premature her entire body including her boobs and her nipples.  She states that 

for the past week the body aches have seemed to be worsening.  Starting on 

Friday, 4 days ago she began feeling fatigued and just wanting to stay in bed 

and to be lazy.  Patient has a history of pancreas and kidney transplant 

performed 4 years ago at [facility].  Patient states that she receives monthly blood 

work.  States that she had turned positive for CMV and she knows that the CMV 

number was going up.  She also states that she has had neutropenia on her recent

lab checks.  She is also recently had her prednisone decreased.  Patient states 

that she called her transplant coordinator and was advised to go to the 

emergency department for evaluation, states that she did not want to drive to 

[facility] today therefore came here.  Patient denies fever, chills, chest pain, coug

h, shortness of breath, abdominal pain, nausea/vomiting/diarrhea, or dysuria.  

She reports that Tylenol relieves her symptoms.  Her last Covid test was about 2

months ago.





- Related Data


Allergies/Adverse Reactions: 


                                        





propoxyphene napsylate [From Darvocet-N 100] Allergy (Severe, Verified 16 

09:00)


   n and v,dizziness,coughing


tuberculin, purified protein deriva [Tuberculin,Purif.Prot.Deriv.] Allergy 

(Severe, Verified 16 09:00)


   PPD CONVERTER


sulfamethoxazole [From Bactrim] Allergy (Verified 19 18:59)


   


tacrolimus [From Envarsus XR] Allergy (Verified 21 18:15)


   


trimethoprim [From Bactrim] Allergy (Verified 19 18:59)


   


hydrocodone bitartrate [From Vicodin] Adverse Reaction (Unknown, Verified 

16 09:00)


   Nausea


oxycodone HCl [From Percocet] Adverse Reaction (Unknown, Verified 16 

09:00)


   Nausea








Home Medications: atrovastatin.  prednisone.  sulfamethroxazole0-trimethoprim.  

tacrolimus.  mycophenolate.  famotidine.  ondansetron.  lispro.  sliding scale. 

latanoprost.  cholecalciferol.  magnesium oxide.  brimonidine-timolol.  asa





Past Medical History





- General


Information source: Patient





- Social History


Smoking Status: Never Smoker


Chew tobacco use (# tins/day): No


Frequency of alcohol use: None


Drug Abuse: None


Family History: Reviewed & Not Pertinent


Patient has homicidal ideation: No





- Past Medical History


Cardiac Medical History: Reports: Hx Hypertension - on meds


   Denies: Hx Coronary Artery Disease, Hx Heart Attack


Pulmonary Medical History: Reports: Hx Tuberculosis - EXPOSURE AT WORK AND 

TREATED


   Denies: Hx Asthma, Hx Bronchitis, Hx COPD, Hx Pneumonia


Neurological Medical History: Denies: Hx Cerebrovascular Accident, Hx Seizures


Endocrine Medical History: Reports: Hx Diabetes Mellitus Type 1


Renal/ Medical History: Reports: Hx End Stage Renal Disease - had kidney 

transplant, Hx Kidney Stones.  Denies: Hx Peritoneal Dialysis


GI Medical History: 


Musculoskeletal Medical History: Reports Hx Arthritis


Psychiatric Medical History: 


   Denies: Hx Depression


Infectious Medical History: 


Past Surgical History: Reports: Hx Hysterectomy, Hx Kidney (Renal Surgery) - 

kidney transplant, Hx Pancreatic Surgery - pancreas transplant, Hx Tubal 

Ligation, Other - Renal and pancreatic transplant.  Denies: Hx Appendectomy, Hx 

Bowel Surgery, Hx  Section, Hx Cholecystectomy, Hx Mastectomy, Hx Open 

Heart Surgery, Hx Tonsillectomy





- Immunizations


Hx Diphtheria, Pertussis, Tetanus Vaccination: Yes





Review of Systems





- Review of Systems


Constitutional: denies: Chills, Fever


EENT: No symptoms reported


Cardiovascular: denies: Chest pain


Respiratory: denies: Short of breath


Gastrointestinal: No symptoms reported


Genitourinary: denies: Dysuria


Female Genitourinary: No symptoms reported


Musculoskeletal: Muscle pain


Skin: No symptoms reported


Hematologic/Lymphatic: No symptoms reported


Neurological/Psychological: No symptoms reported





Physical Exam





- Vital signs


Vitals: 


                                        











Temp Pulse Resp BP Pulse Ox


 


 98.4 F   100   16   171/87 H  96 


 


 21 17:40  21 17:40  21 17:40  21 17:40  21 17:40














- General


General appearance: Appears well, Alert


In distress: None





- HEENT


Head: Normocephalic, Atraumatic


Extraocular movements intact: Yes


Pupils: PERRL


Neck: Supple





- Respiratory


Breath sounds: Normal





- Cardiovascular


Rhythm: Regular


Heart sounds: Normal auscultation





- Abdominal


Inspection: Obese


Distension: No distension


Tenderness: Nontender





- Back


Back: Nontender.  No: CVA tenderness, Vertebra tenderness





- Extremities


General upper extremity: Normal ROM


General lower extremity: Normal ROM.  No: Edema





- Neurological


Neuro grossly intact: Yes


Cognition: Normal


Orientation: AAOx4





- Psychological


Associated symptoms: Normal affect





- Skin


Skin Temperature: Warm





Course





- Re-evaluation


Re-evalutation: 





50-year-old female history pancreas/kidney transplant 4 years ago on 

immunosuppressive therapy here for generalized body aches x1 month, possibly 

worsening within the past week along with some fatigue.  On exam patient is 

afebrile, hemodynamically stable, nontoxic-appearing.  Lungs are clear, heart 

RRR, abdomen is soft, I do not find any discrete areas of tenderness on exam in 

addition to no midline spinal tenderness.  She is neurologically intact.  She 

had a laboratory evaluation done prior to evaluation.  Leukopenia/neutropenia, 

this has been seen on the past 2 lab draws since December.  No acute anemia.  

Electrolytes within normal limits.  Creatinine within normal limits.  No marked 

transaminitis.  No elevation of CK.  Lipase within normal limits.  Urine does 

not suggest UTI.  I discussed with her her overall reassuring laboratory 

evaluation.  I discussed that potentially this is due to the decreased 

prednisone amount she is taking, possibly related to CMV, and of course given 

the pandemic possible she could have Covid, she was agreeable to testing.  I 

encouraged her to have very close follow-up with her transplant team.  Return 

precautions given, stable at time of discharge.








- Vital Signs


Vital signs: 


                                        











Temp Pulse Resp BP Pulse Ox


 


 98.9 F   100   24 H  110/72   94 


 


 21 23:18  21 17:40  21 23:18  21 23:18  21 23:18














- Laboratory Results


Result Diagrams: 


                                 21 18:06





                                 21 18:06


Laboratory Results Interpreted: 


                                        











  21





  18:00 18:06 18:06


 


WBC   2.4 L 


 


Plt Count   142 L 


 


Absolute Neuts (auto)   1.6 L 


 


Sodium    136.4 L


 


Glucose    250 H


 


AST    46 H


 


Urine Protein  30 H  


 


Urine Glucose (UA)  >=500 H  


 


Urine Urobilinogen  2.0 H  











Critical Laboratory Results Reviewed: No Critical Results





- Radiology Results


Critical Radiology Results Reviewed: No Critical Results





Discharge





- Discharge


Clinical Impression: 


 Generalized body aches, Person under investigation for COVID-19





Neutropenia


Qualifiers:


 Neutropenia type: other drug-induced Qualified Code(s): D70.2 - Other drug-

induced agranulocytosis





Disposition: HOME, SELF-CARE


Additional Instructions: 


Please call your transplant team to discuss lab results from today, overall 

reassuring work-up aside from the neutropenia which has been present for the 

past month.  He should receive results from Covid swab in about 2 to 3 days.  

Please return to the emergency department for any concerning worsening symptoms.


Referrals: 


KIM MACK MD [Primary Care Provider] - Follow up as needed